# Patient Record
Sex: FEMALE | Race: WHITE | Employment: OTHER | URBAN - METROPOLITAN AREA
[De-identification: names, ages, dates, MRNs, and addresses within clinical notes are randomized per-mention and may not be internally consistent; named-entity substitution may affect disease eponyms.]

---

## 2016-08-16 LAB
CREATININE, EXTERNAL: 0.98
LDL-C, EXTERNAL: 94

## 2017-11-08 ENCOUNTER — CLINICAL SUPPORT (OUTPATIENT)
Dept: CARDIOLOGY CLINIC | Age: 82
End: 2017-11-08

## 2017-11-08 ENCOUNTER — OFFICE VISIT (OUTPATIENT)
Dept: CARDIOLOGY CLINIC | Age: 82
End: 2017-11-08

## 2017-11-08 VITALS
RESPIRATION RATE: 20 BRPM | HEIGHT: 60 IN | SYSTOLIC BLOOD PRESSURE: 140 MMHG | DIASTOLIC BLOOD PRESSURE: 66 MMHG | BODY MASS INDEX: 18.85 KG/M2 | WEIGHT: 96 LBS | HEART RATE: 76 BPM

## 2017-11-08 DIAGNOSIS — I10 HTN (HYPERTENSION), BENIGN: ICD-10-CM

## 2017-11-08 DIAGNOSIS — Z95.0 CARDIAC PACEMAKER IN SITU: Primary | ICD-10-CM

## 2017-11-08 DIAGNOSIS — R55 SYNCOPE, UNSPECIFIED SYNCOPE TYPE: ICD-10-CM

## 2017-11-08 DIAGNOSIS — Z95.0 PACEMAKER: Primary | ICD-10-CM

## 2017-11-08 DIAGNOSIS — I44.2 COMPLETE HEART BLOCK (HCC): ICD-10-CM

## 2017-11-08 RX ORDER — DONEPEZIL HYDROCHLORIDE 10 MG/1
10 TABLET, FILM COATED ORAL
COMMUNITY
Start: 2017-10-27

## 2017-11-08 NOTE — PROGRESS NOTES
HISTORY OF PRESENTING ILLNESS      Mathew Wong is a 80 y.o. female with syncope, advanced heart block who underwent PPM implantation. She presents for follow up of pacemaker. EKG today shows atrial paced rhythm. Device interrogation shows normal device function. Pt says she is doing well with no cardiac complaints today. The patient denies chest pain/ shortness of breath, orthopnea, PND, LE edema, palpitations, syncope, presyncope or fatigue. 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    HTN (hypertension), benign 11/23/2014    Hyperlipidemia 11/23/2014    H/O herpes zoster 11/23/2014           PAST MEDICAL HISTORY     Past Medical History:   Diagnosis Date    Diverticulitis     Hyperlipidemia     Hypertension     Pacemaker     Psychiatric disorder     anxiety    Shingles            PAST SURGICAL HISTORY     Past Surgical History:   Procedure Laterality Date    BREAST SURGERY PROCEDURE UNLISTED      cyst removal    HX APPENDECTOMY      HX OTHER SURGICAL  8/18/15    Medtronic dual chamber PPM          ALLERGIES     No Known Allergies       FAMILY HISTORY     History reviewed. No pertinent family history. negative for cardiac disease       SOCIAL HISTORY     Social History     Social History    Marital status:      Spouse name: N/A    Number of children: N/A    Years of education: N/A     Social History Main Topics    Smoking status: Never Smoker    Smokeless tobacco: Never Used    Alcohol use No      Comment: rarely    Drug use: No    Sexual activity: Not Asked     Other Topics Concern    None     Social History Narrative         MEDICATIONS     Current Outpatient Prescriptions   Medication Sig    donepezil (ARICEPT) 10 mg tablet 10 mg nightly.  losartan (COZAAR) 50 mg tablet One pill by mouth at bedtime.  aspirin delayed-release 81 mg tablet Take 81 mg by mouth daily.  pravastatin (PRAVACHOL) 20 mg tablet Take 20 mg by mouth every evening.  acetaminophen (TYLENOL EXTRA STRENGTH) 500 mg tablet Take 500 mg by mouth two (2) times a day.  cyanocobalamin 1,000 mcg tablet Take 500 mcg by mouth daily.  triamcinolone (NASACORT AQ) 55 mcg nasal inhaler 1 [place] by Both Nostrils route daily as needed.  TETRAHYDROZOLINE HCL/PEG (EYE MOISTURIZING RELIEF OP) Apply 1 Drop to eye daily as needed (dry eyes). No current facility-administered medications for this visit. I have reviewed the nurses notes, vitals, problem list, allergy list, medical history, family, social history and medications. REVIEW OF SYMPTOMS      General: Pt denies excessive weight gain or loss. Pt is able to conduct ADL's  HEENT: Denies blurred vision, headaches, hearing loss, epistaxis and difficulty swallowing. Respiratory: Denies cough, congestion, shortness of breath, MIRANDA, wheezing or stridor. Cardiovascular: Denies precordial pain, palpitations, edema or PND  Gastrointestinal: Denies poor appetite, indigestion, abdominal pain or blood in stool  Genitourinary: Denies hematuria, dysuria, increased urinary frequency  Musculoskeletal: Denies joint pain or swelling from muscles or joints  Neurologic: Denies tremor, paresthesias, headache, or sensory motor disturbance  Psychiatric: Denies confusion, insomnia, depression  Integumentray: Denies rash, itching or ulcers. Hematologic: Denies easy bruising, bleeding       PHYSICAL EXAMINATION      Vitals:    11/08/17 1411   BP: 140/66   Pulse: 76   Resp: 20   Weight: 96 lb (43.5 kg)   Height: 5' (1.524 m)     General: Well developed, in no acute distress. HEENT: No jaundice, oral mucosa moist, no oral ulcers  Neck: Supple, no stiffness, no lymphadenopathy, supple  Heart:  +murmur, Normal S1/S2 negative S3 or S4. Regular,  gallop or rub, no jugular venous distention  Respiratory: Clear bilaterally x 4, no wheezing or rales  Abdomen:   Soft, non-tender, bowel sounds are active.   Extremities:  No edema, normal cap refill, no cyanosis. Musculoskeletal: No clubbing, no deformities  Neuro: A&Ox3, speech clear, gait stable, cooperative, no focal neurologic deficits  Skin: Skin color is normal. No rashes or lesions. Non diaphoretic, moist.  Vascular: 2+ pulses symmetric in all extremities       DIAGNOSTIC DATA      EKG: atrial paced     LABORATORY DATA      Lab Results   Component Value Date/Time    WBC 8.6 08/18/2015 04:35 AM    Hemoglobin (POC) 14.6 06/29/2016 11:35 AM    HGB 12.0 08/18/2015 04:35 AM    Hematocrit (POC) 43 06/29/2016 11:35 AM    HCT 37.4 08/18/2015 04:35 AM    PLATELET 763 30/87/4962 04:35 AM    MCV 99.2 08/18/2015 04:35 AM      Lab Results   Component Value Date/Time    Sodium 139 08/19/2015 03:55 AM    Potassium 4.0 08/19/2015 03:55 AM    Chloride 106 08/19/2015 03:55 AM    CO2 27 08/19/2015 03:55 AM    Anion gap 6 08/19/2015 03:55 AM    Glucose 93 08/19/2015 03:55 AM    BUN 15 08/19/2015 03:55 AM    Creatinine 0.77 08/19/2015 03:55 AM    BUN/Creatinine ratio 19 08/19/2015 03:55 AM    GFR est AA >60 08/19/2015 03:55 AM    GFR est non-AA >60 08/19/2015 03:55 AM    Calcium 9.2 08/19/2015 03:55 AM    Bilirubin, total 0.5 08/18/2015 04:35 AM    AST (SGOT) 15 08/18/2015 04:35 AM    Alk. phosphatase 46 08/18/2015 04:35 AM    Protein, total 5.9 08/18/2015 04:35 AM    Albumin 3.5 08/18/2015 04:35 AM    Globulin 2.4 08/18/2015 04:35 AM    A-G Ratio 1.5 08/18/2015 04:35 AM    ALT (SGPT) 18 08/18/2015 04:35 AM           ASSESSMENT      1. Complete heart block  2. PPM  3. Syncope  4. Hypertension  5. Hyperlipidemia     PLAN     Continue with current medical therapy and per device clinic.      FOLLOW-UP   1 year    Thank you, Royal Hou MD for allowing me to participate in the care of this extraordinarily pleasant female. Please do not hesitate to contact me for further questions/concerns.          Renteria Haring, NP      [address] [facility] [address]  [address] [facility], [address], [place] [facility] [place]. Kyle Velez.    [facility], [facility]  [phone] / [phone] Fax   [phone] / [phone] Fax

## 2017-11-08 NOTE — PROGRESS NOTES
Visit Vitals    /66 (BP 1 Location: Left arm, BP Patient Position: Sitting)    Pulse 76    Resp 20    Ht 5' (1.524 m)    Wt 96 lb (43.5 kg)    BMI 18.75 kg/m2

## 2017-11-15 ENCOUNTER — HOSPITAL ENCOUNTER (EMERGENCY)
Age: 82
Discharge: HOME | End: 2017-11-15
Attending: EMERGENCY MEDICINE | Admitting: EMERGENCY MEDICINE
Payer: MEDICARE

## 2017-11-15 ENCOUNTER — APPOINTMENT (OUTPATIENT)
Dept: CT IMAGING | Age: 82
End: 2017-11-15
Attending: EMERGENCY MEDICINE
Payer: MEDICARE

## 2017-11-15 ENCOUNTER — APPOINTMENT (OUTPATIENT)
Dept: GENERAL RADIOLOGY | Age: 82
End: 2017-11-15
Attending: EMERGENCY MEDICINE
Payer: MEDICARE

## 2017-11-15 VITALS
DIASTOLIC BLOOD PRESSURE: 79 MMHG | RESPIRATION RATE: 24 BRPM | TEMPERATURE: 98.4 F | SYSTOLIC BLOOD PRESSURE: 146 MMHG | HEART RATE: 74 BPM | WEIGHT: 94 LBS | OXYGEN SATURATION: 99 % | BODY MASS INDEX: 18.36 KG/M2

## 2017-11-15 DIAGNOSIS — R53.1 WEAKNESS: ICD-10-CM

## 2017-11-15 DIAGNOSIS — J20.9 ACUTE BRONCHITIS, UNSPECIFIED ORGANISM: Primary | ICD-10-CM
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
## 2017-11-15 LAB
ALBUMIN SERPL-MCNC: 3.7 G/DL (ref 3.5–5)
ALBUMIN/GLOB SERPL: 1.1 {RATIO} (ref 1.1–2.2)
ALP SERPL-CCNC: 62 U/L (ref 45–117)
ALT SERPL-CCNC: 16 U/L (ref 12–78)
ANION GAP SERPL CALC-SCNC: 7 MMOL/L (ref 5–15)
APPEARANCE UR: CLEAR
AST SERPL-CCNC: 15 U/L (ref 15–37)
ATRIAL RATE: 75 BPM
BACTERIA URNS QL MICRO: ABNORMAL /HPF
BASOPHILS # BLD: 0.1 K/UL (ref 0–0.1)
BASOPHILS NFR BLD: 1 % (ref 0–1)
BILIRUB SERPL-MCNC: 0.6 MG/DL (ref 0.2–1)
BILIRUB UR QL: NEGATIVE
BUN SERPL-MCNC: 18 MG/DL (ref 6–20)
BUN/CREAT SERPL: 16 (ref 12–20)
CALCIUM SERPL-MCNC: 9.5 MG/DL (ref 8.5–10.1)
CALCULATED P AXIS, ECG09: 62 DEGREES
CALCULATED R AXIS, ECG10: 27 DEGREES
CALCULATED T AXIS, ECG11: 53 DEGREES
CHLORIDE SERPL-SCNC: 103 MMOL/L (ref 97–108)
CO2 SERPL-SCNC: 29 MMOL/L (ref 21–32)
COLOR UR: ABNORMAL
CREAT SERPL-MCNC: 1.1 MG/DL (ref 0.55–1.02)
DIAGNOSIS, 93000: NORMAL
DIFFERENTIAL METHOD BLD: ABNORMAL
EOSINOPHIL # BLD: 0.1 K/UL (ref 0–0.4)
EOSINOPHIL NFR BLD: 1 % (ref 0–7)
EPITH CASTS URNS QL MICRO: ABNORMAL /LPF
ERYTHROCYTE [DISTWIDTH] IN BLOOD BY AUTOMATED COUNT: 13.6 % (ref 11.5–14.5)
GLOBULIN SER CALC-MCNC: 3.3 G/DL (ref 2–4)
GLUCOSE SERPL-MCNC: 114 MG/DL (ref 65–100)
GLUCOSE UR STRIP.AUTO-MCNC: NEGATIVE MG/DL
HCT VFR BLD AUTO: 46.9 % (ref 35–47)
HGB BLD-MCNC: 15.4 G/DL (ref 11.5–16)
HGB UR QL STRIP: NEGATIVE
KETONES UR QL STRIP.AUTO: ABNORMAL MG/DL
LACTATE SERPL-SCNC: 1 MMOL/L (ref 0.4–2)
LEUKOCYTE ESTERASE UR QL STRIP.AUTO: NEGATIVE
LYMPHOCYTES # BLD: 0.7 K/UL (ref 0.8–3.5)
LYMPHOCYTES NFR BLD: 6 % (ref 12–49)
MCH RBC QN AUTO: 32.8 PG (ref 26–34)
MCHC RBC AUTO-ENTMCNC: 32.8 G/DL (ref 30–36.5)
MCV RBC AUTO: 99.8 FL (ref 80–99)
MONOCYTES # BLD: 0.6 K/UL (ref 0–1)
MONOCYTES NFR BLD: 5 % (ref 5–13)
MUCOUS THREADS URNS QL MICRO: ABNORMAL /LPF
NEUTS SEG # BLD: 10.8 K/UL (ref 1.8–8)
NEUTS SEG NFR BLD: 87 % (ref 32–75)
NITRITE UR QL STRIP.AUTO: NEGATIVE
P-R INTERVAL, ECG05: 160 MS
PH UR STRIP: 8 [PH] (ref 5–8)
PLATELET # BLD AUTO: 261 K/UL (ref 150–400)
POTASSIUM SERPL-SCNC: 4.3 MMOL/L (ref 3.5–5.1)
PROT SERPL-MCNC: 7 G/DL (ref 6.4–8.2)
PROT UR STRIP-MCNC: ABNORMAL MG/DL
Q-T INTERVAL, ECG07: 384 MS
QRS DURATION, ECG06: 64 MS
QTC CALCULATION (BEZET), ECG08: 428 MS
RBC # BLD AUTO: 4.7 M/UL (ref 3.8–5.2)
RBC #/AREA URNS HPF: ABNORMAL /HPF (ref 0–5)
RBC MORPH BLD: ABNORMAL
SODIUM SERPL-SCNC: 139 MMOL/L (ref 136–145)
SP GR UR REFRACTOMETRY: 1.01 (ref 1–1.03)
TROPONIN I BLD-MCNC: <0.04 NG/ML (ref 0–0.08)
UA: UC IF INDICATED,UAUC: ABNORMAL
UROBILINOGEN UR QL STRIP.AUTO: 0.2 EU/DL (ref 0.2–1)
VENTRICULAR RATE, ECG03: 75 BPM
WBC # BLD AUTO: 12.3 K/UL (ref 3.6–11)
WBC URNS QL MICRO: ABNORMAL /HPF (ref 0–4)

## 2017-11-15 PROCEDURE — 74011250636 HC RX REV CODE- 250/636: Performed by: EMERGENCY MEDICINE

## 2017-11-15 PROCEDURE — 93005 ELECTROCARDIOGRAM TRACING: CPT

## 2017-11-15 PROCEDURE — 36415 COLL VENOUS BLD VENIPUNCTURE: CPT | Performed by: EMERGENCY MEDICINE

## 2017-11-15 PROCEDURE — 96360 HYDRATION IV INFUSION INIT: CPT

## 2017-11-15 PROCEDURE — 90715 TDAP VACCINE 7 YRS/> IM: CPT | Performed by: EMERGENCY MEDICINE

## 2017-11-15 PROCEDURE — 73080 X-RAY EXAM OF ELBOW: CPT

## 2017-11-15 PROCEDURE — 90471 IMMUNIZATION ADMIN: CPT

## 2017-11-15 PROCEDURE — 87086 URINE CULTURE/COLONY COUNT: CPT | Performed by: EMERGENCY MEDICINE

## 2017-11-15 PROCEDURE — 83605 ASSAY OF LACTIC ACID: CPT | Performed by: EMERGENCY MEDICINE

## 2017-11-15 PROCEDURE — 85025 COMPLETE CBC W/AUTO DIFF WBC: CPT | Performed by: EMERGENCY MEDICINE

## 2017-11-15 PROCEDURE — 96361 HYDRATE IV INFUSION ADD-ON: CPT

## 2017-11-15 PROCEDURE — 71020 XR CHEST PA LAT: CPT

## 2017-11-15 PROCEDURE — 81001 URINALYSIS AUTO W/SCOPE: CPT | Performed by: EMERGENCY MEDICINE

## 2017-11-15 PROCEDURE — 70450 CT HEAD/BRAIN W/O DYE: CPT

## 2017-11-15 PROCEDURE — 99285 EMERGENCY DEPT VISIT HI MDM: CPT

## 2017-11-15 PROCEDURE — 80053 COMPREHEN METABOLIC PANEL: CPT | Performed by: EMERGENCY MEDICINE

## 2017-11-15 PROCEDURE — 84484 ASSAY OF TROPONIN QUANT: CPT

## 2017-11-15 RX ORDER — LEVOFLOXACIN 750 MG/1
750 TABLET ORAL DAILY
Qty: 5 TAB | Refills: 0 | Status: SHIPPED | OUTPATIENT
Start: 2017-11-15 | End: 2018-06-10

## 2017-11-15 RX ADMIN — TETANUS TOXOID, REDUCED DIPHTHERIA TOXOID AND ACELLULAR PERTUSSIS VACCINE, ADSORBED 0.5 ML: 5; 2.5; 8; 8; 2.5 SUSPENSION INTRAMUSCULAR at 10:56

## 2017-11-15 RX ADMIN — SODIUM CHLORIDE 1000 ML: 900 INJECTION, SOLUTION INTRAVENOUS at 10:35

## 2017-11-15 NOTE — ED PROVIDER NOTES
HPI Comments: Hx HTN, hyperlipidemia, S/P pacer, diverticulitis, anxiety, dementia; presents with weakness, a cough and two recent falls; hx is obtained mainly from her daughter who states pt has had increasing weakness over the past 2 weeks; she had a fall about 2 weeks ago and again today; her daughter feels the falls are the result of weakness; pt began with a cough last night and \"coughed all night long\"; pt is more confused also per daughter; no known fever; no resp distress. Good PO intake. No complaints of CP, abd pain, V, D.    Her daughter states pt's  is in [facility] and has had a cough. Marilyn Catherineelor were smooching\" when pt visited him 6 days ago. Her daughter states pt is normally able to walk unassisted and go to the bathroom and fix some meals on her own. Today she cannot stand unassisted. Past Medical History:   Diagnosis Date    Diverticulitis     Hyperlipidemia     Hypertension     Pacemaker     Psychiatric disorder     anxiety    Shingles        Past Surgical History:   Procedure Laterality Date    BREAST SURGERY PROCEDURE UNLISTED      cyst removal    HX APPENDECTOMY      HX OTHER SURGICAL  8/18/15    Medtronic dual chamber PPM         History reviewed. No pertinent family history. Social History     Social History    Marital status:      Spouse name: N/A    Number of children: N/A    Years of education: N/A     Occupational History    Not on file. Social History Main Topics    Smoking status: Never Smoker    Smokeless tobacco: Never Used    Alcohol use No      Comment: rarely    Drug use: No    Sexual activity: Not on file     Other Topics Concern    Not on file     Social History Narrative         ALLERGIES: Review of patient's allergies indicates no known allergies. Review of Systems   All other systems reviewed and are negative.       Vitals:    11/15/17 1014   BP: 146/83   Pulse: 73   Resp: 18   Temp: 98.4 °F (36.9 °C)   SpO2: 98%   Weight: 42.6 kg (94 lb)            Physical Exam   Constitutional: She appears well-developed and well-nourished. No distress. Mildly ill-appearing. Occasional cough. Hard of hearing. HENT:   Head: Normocephalic and atraumatic. Eyes: Conjunctivae are normal. No scleral icterus. Neck: Neck supple. No tracheal deviation present. Cardiovascular: Normal rate, regular rhythm and intact distal pulses. Exam reveals no gallop and no friction rub. Murmur heard. Pulmonary/Chest: Effort normal and breath sounds normal. She has no wheezes. She has no rales. Abdominal: Soft. She exhibits no distension. There is no tenderness. There is no rebound and no guarding. Musculoskeletal:   Left elbow swelling and bruising with mild skin tears noted. No tenderness illicited. FROM,  NVI. Neurological: She is alert. CN's intact; strength and sensation normal.  Limited historian. Follows simple commands. Appears confused but also hard of hearing. Skin: Skin is warm and dry. No rash noted. Psychiatric: She has a normal mood and affect. Nursing note and vitals reviewed. [facility]  ED Course       Procedures    EKG: NSR with sinus arrythmia; rate - 75; normal ST,T.  Jennifer Paul MD  10:40 AM    Progress Note:  Results, treatment, and follow up plan have been discussed with patient/family. Questions were answered. Jennifer Paul MD  12:23 PM    A/P: weakness/cough/recent falls in 80year old with some dementia - reassuring exam; VSS; sats ok; WBC mildly elevated; remainder of CBC, CMP, poc trop, lactate, CXR, EKG, UA ok; feel she is stable for discharge; will treat resp infection with Levaquin; close PCP f/u; return to ED for worsening of condition.   eJnnifer Paul MD  12:25 PM

## 2017-11-15 NOTE — ED NOTES
Pt resting quietly on the stretcher in no distress with family at bedside. Non-adherent dressing and 3\" henry applied to left elbow skin tear and wrapped using a 3\" ace bandage. + CNS pre and post application. Will continue to monitor.

## 2017-11-15 NOTE — DISCHARGE INSTRUCTIONS
Bronchitis: Care Instructions  Your Care Instructions    Bronchitis is inflammation of the bronchial tubes, which carry air to the lungs. The tubes swell and produce mucus, or phlegm. The mucus and inflamed bronchial tubes make you cough. You may have trouble breathing. Most cases of bronchitis are caused by viruses like those that cause colds. Antibiotics usually do not help and they may be harmful. Bronchitis usually develops rapidly and lasts about 2 to 3 weeks in otherwise healthy people. Follow-up care is a key part of your treatment and safety. Be sure to make and go to all appointments, and call your doctor if you are having problems. It's also a good idea to know your test results and keep a list of the medicines you take. How can you care for yourself at home? · Take all medicines exactly as prescribed. Call your doctor if you think you are having a problem with your medicine. · Get some extra rest.  · Take an over-the-counter pain medicine, such as acetaminophen (Tylenol), ibuprofen (Advil, Motrin), or naproxen (Aleve) to reduce fever and relieve body aches. Read and follow all instructions on the label. · Do not take two or more pain medicines at the same time unless the doctor told you to. Many pain medicines have acetaminophen, which is Tylenol. Too much acetaminophen (Tylenol) can be harmful. · Take an over-the-counter cough medicine that contains dextromethorphan to help quiet a dry, hacking cough so that you can sleep. Avoid cough medicines that have more than one active ingredient. Read and follow all instructions on the label. · Breathe moist air from a humidifier, hot shower, or sink filled with hot water. The heat and moisture will thin mucus so you can cough it out. · Do not smoke. Smoking can make bronchitis worse. If you need help quitting, talk to your doctor about stop-smoking programs and medicines. These can increase your chances of quitting for good.   When should you call for help? Call 911 anytime you think you may need emergency care. For example, call if:  ? · You have severe trouble breathing. ?Call your doctor now or seek immediate medical care if:  ? · You have new or worse trouble breathing. ? · You cough up dark brown or bloody mucus (sputum). ? · You have a new or higher fever. ? · You have a new rash. ? Watch closely for changes in your health, and be sure to contact your doctor if:  ? · You cough more deeply or more often, especially if you notice more mucus or a change in the color of your mucus. ? · You are not getting better as expected. Where can you learn more? Go to http://aquiles-loela.info/. Enter H333 in the search box to learn more about \"Bronchitis: Care Instructions. \"  Current as of: May 12, 2017  Content Version: 11.4  © 6491-7192 [employer]. Care instructions adapted under license by [employer] (which disclaims liability or warranty for this information). If you have questions about a medical condition or this instruction, always ask your healthcare professional. [place] [address] any warranty or liability for your use of this information. Weakness: Care Instructions  Your Care Instructions    Weakness is a lack of physical or muscle strength. You may feel that you need to make extra effort to move your arms, legs, or other muscles. Generalized weakness means that you feel weak in most areas of your body. Another type of weakness may affect just one muscle or group of muscles. You may feel weak and tired after you have done too much activity, such as taking an extra-long hike. This is not a serious problem. It often goes away on its own. Feeling weak can also be caused by medical conditions like thyroid problems, depression, or a virus. Sometimes the cause can be serious. Your doctor may want to do more tests to try to find the cause of the weakness.   The doctor has checked you carefully, but problems can develop later. If you notice any problems or new symptoms, get medical treatment right away. Follow-up care is a key part of your treatment and safety. Be sure to make and go to all appointments, and call your doctor if you are having problems. It's also a good idea to know your test results and keep a list of the medicines you take. How can you care for yourself at home? · Rest when you feel tired. · Be safe with medicines. If your doctor prescribed medicine, take it exactly as prescribed. Call your doctor if you think you are having a problem with your medicine. You will get more details on the specific medicines your doctor prescribes. · Do not skip meals. Eating a balanced diet may increase your energy level. · Get some physical activity every day, but do not get too tired. When should you call for help? Call your doctor now or seek immediate medical care if:  ? · You have new or worse weakness. ? · You are dizzy or lightheaded, or you feel like you may faint. ? Watch closely for changes in your health, and be sure to contact your doctor if:  ? · You do not get better as expected. Where can you learn more? Go to http://aquiles-leola.info/. Enter 493 [id number] [id number] in the search box to learn more about \"Weakness: Care Instructions. \"  Current as of: March 20, 2017  Content Version: 11.4  © 2925-3344 [employer]. Care instructions adapted under license by [employer] (which disclaims liability or warranty for this information). If you have questions about a medical condition or this instruction, always ask your healthcare professional. [address] any warranty or liability for your use of this information.

## 2017-11-15 NOTE — ED TRIAGE NOTES
Per daughter pt has experienced two falls over the two weeks without injury. Last fall this am.  Pt states that she went to the bathroom and became weak and fell. Pt denies pain at this time. Daughter states that the pt has been experiencing weakness and cough since last night.

## 2017-11-17 LAB
BACTERIA SPEC CULT: NORMAL
CC UR VC: NORMAL
SERVICE CMNT-IMP: NORMAL

## 2018-06-10 ENCOUNTER — APPOINTMENT (OUTPATIENT)
Dept: GENERAL RADIOLOGY | Age: 83
End: 2018-06-10
Attending: EMERGENCY MEDICINE
Payer: MEDICARE

## 2018-06-10 ENCOUNTER — HOSPITAL ENCOUNTER (EMERGENCY)
Age: 83
Discharge: HOME | End: 2018-06-10
Attending: EMERGENCY MEDICINE
Payer: MEDICARE

## 2018-06-10 VITALS
SYSTOLIC BLOOD PRESSURE: 157 MMHG | WEIGHT: 98 LBS | HEART RATE: 74 BPM | HEIGHT: 61 IN | BODY MASS INDEX: 18.5 KG/M2 | OXYGEN SATURATION: 97 % | DIASTOLIC BLOOD PRESSURE: 73 MMHG | TEMPERATURE: 98.6 F | RESPIRATION RATE: 16 BRPM

## 2018-06-10 DIAGNOSIS — S81.801A WOUND OF RIGHT LOWER EXTREMITY, INITIAL ENCOUNTER: Primary | ICD-10-CM

## 2018-06-10 DIAGNOSIS — L03.115 CELLULITIS OF RIGHT LOWER EXTREMITY: ICD-10-CM

## 2018-06-10 LAB
ANION GAP BLD CALC-SCNC: 16 MMOL/L (ref 10–20)
BASOPHILS # BLD: 0.1 K/UL (ref 0–0.1)
BASOPHILS NFR BLD: 1 % (ref 0–1)
BUN BLD-MCNC: 23 MG/DL (ref 9–20)
CA-I BLD-MCNC: 1.27 MMOL/L (ref 1.12–1.32)
CHLORIDE BLD-SCNC: 100 MMOL/L (ref 98–107)
CO2 BLD-SCNC: 27 MMOL/L (ref 21–32)
CREAT BLD-MCNC: 1 MG/DL (ref 0.6–1.3)
DIFFERENTIAL METHOD BLD: ABNORMAL
EOSINOPHIL # BLD: 0.2 K/UL (ref 0–0.4)
EOSINOPHIL NFR BLD: 2 % (ref 0–7)
ERYTHROCYTE [DISTWIDTH] IN BLOOD BY AUTOMATED COUNT: 13.7 % (ref 11.5–14.5)
GLUCOSE BLD-MCNC: 77 MG/DL (ref 65–100)
HCT VFR BLD AUTO: 40.9 % (ref 35–47)
HCT VFR BLD CALC: 41 % (ref 35–47)
HGB BLD-MCNC: 13.7 G/DL (ref 11.5–16)
LYMPHOCYTES # BLD: 1.5 K/UL (ref 0.8–3.5)
LYMPHOCYTES NFR BLD: 18 % (ref 12–49)
MCH RBC QN AUTO: 33.9 PG (ref 26–34)
MCHC RBC AUTO-ENTMCNC: 33.5 G/DL (ref 30–36.5)
MCV RBC AUTO: 101.2 FL (ref 80–99)
MONOCYTES # BLD: 0.5 K/UL (ref 0–1)
MONOCYTES NFR BLD: 7 % (ref 5–13)
NEUTS SEG # BLD: 6.1 K/UL (ref 1.8–8)
NEUTS SEG NFR BLD: 72 % (ref 32–75)
PLATELET # BLD AUTO: 286 K/UL (ref 150–400)
PMV BLD AUTO: 10.7 FL (ref 8.9–12.9)
POTASSIUM BLD-SCNC: 4.9 MMOL/L (ref 3.5–5.1)
RBC # BLD AUTO: 4.04 M/UL (ref 3.8–5.2)
SERVICE CMNT-IMP: ABNORMAL
SODIUM BLD-SCNC: 137 MMOL/L (ref 136–145)
WBC # BLD AUTO: 8.3 K/UL (ref 3.6–11)
XXWBCSUS: 0

## 2018-06-10 PROCEDURE — 73590 X-RAY EXAM OF LOWER LEG: CPT

## 2018-06-10 PROCEDURE — 80047 BASIC METABLC PNL IONIZED CA: CPT

## 2018-06-10 PROCEDURE — 36415 COLL VENOUS BLD VENIPUNCTURE: CPT | Performed by: EMERGENCY MEDICINE

## 2018-06-10 PROCEDURE — 99283 EMERGENCY DEPT VISIT LOW MDM: CPT

## 2018-06-10 PROCEDURE — 74011250637 HC RX REV CODE- 250/637: Performed by: EMERGENCY MEDICINE

## 2018-06-10 PROCEDURE — 85025 COMPLETE CBC W/AUTO DIFF WBC: CPT | Performed by: EMERGENCY MEDICINE

## 2018-06-10 RX ORDER — DOXYCYCLINE 100 MG/1
100 CAPSULE ORAL 2 TIMES DAILY
Qty: 14 CAP | Refills: 0 | Status: SHIPPED | OUTPATIENT
Start: 2018-06-10 | End: 2018-10-26

## 2018-06-10 RX ADMIN — BACITRACIN ZINC, NEOMYCIN SULFATE, POLYMYXIN B SULFATE 1 PACKET: 3.5; 5000; 4 OINTMENT TOPICAL at 17:00

## 2018-06-10 NOTE — ED PROVIDER NOTES
HPI Comments: 79 yo WF with pacemaker presents with c/o wound to left calf. Per family they are unsure how long it was there but pt complained about it today. She walks with a cane and they deny recent falls or injury. She is not diabetic, has not had a fever and has been acting like herself per family. They cleaned it and put neosporin on it with a bandaid. She declines anything for pain. Unknown last tetanus    Patient is a 80 y.o. female presenting with skin problem. The history is provided by a relative and the patient. Skin Problem           Past Medical History:   Diagnosis Date    Diverticulitis     Hyperlipidemia     Hypertension     Pacemaker     Psychiatric disorder     anxiety    Shingles        Past Surgical History:   Procedure Laterality Date    BREAST SURGERY PROCEDURE UNLISTED      cyst removal    HX APPENDECTOMY      HX OTHER SURGICAL  8/18/15    Medtronic dual chamber PPM         History reviewed. No pertinent family history. Social History     Social History    Marital status:      Spouse name: N/A    Number of children: N/A    Years of education: N/A     Occupational History    Not on file. Social History Main Topics    Smoking status: Never Smoker    Smokeless tobacco: Never Used    Alcohol use No      Comment: rarely    Drug use: No    Sexual activity: Not on file     Other Topics Concern    Not on file     Social History Narrative         ALLERGIES: Review of patient's allergies indicates no known allergies. Review of Systems   Constitutional: Negative for chills and fever. Gastrointestinal: Negative for nausea and vomiting. Skin: Positive for wound. All other systems reviewed and are negative. Vitals:    06/10/18 1546   BP: 157/73   Pulse: 74   Resp: 16   Temp: 98.6 °F (37 °C)   SpO2: 97%   Weight: 44.5 kg (98 lb)   Height: 5' 1\" (1.549 m)            Physical Exam   Constitutional: She appears well-developed and well-nourished. No distress. HENT:   Head: Normocephalic and atraumatic. Eyes: Conjunctivae are normal.   Neck: Normal range of motion. Neck supple. Cardiovascular: Normal rate, regular rhythm, normal heart sounds and intact distal pulses. Exam reveals no friction rub. No murmur heard. Palpable dp pulses b/l with warm feet   Pulmonary/Chest: Effort normal and breath sounds normal. No respiratory distress. She has no wheezes. She has no rales. She exhibits no tenderness. Abdominal: Soft. Bowel sounds are normal. She exhibits no distension. There is no tenderness. There is no rebound and no guarding. Musculoskeletal: Normal range of motion. She exhibits no edema or tenderness. Neurological: She is alert. She exhibits normal muscle tone. Coordination normal.   Skin: Skin is warm and dry. She is not diaphoretic. No pallor. Right posterior calf with 3 abraded areas; some surrounding erythema no drainage   Psychiatric: She has a normal mood and affect. Her behavior is normal.   Nursing note and vitals reviewed. MDM  Number of Diagnoses or Management Options  Cellulitis of right lower extremity:   Wound of right lower extremity, initial encounter:   Diagnosis management comments: Looks like pt had a break in the skin and now has a cellulitis no ulcer formation yet. Check white count and xray given age but low suspicion for gas forming organism or blood stream infection       Amount and/or Complexity of Data Reviewed  Clinical lab tests: ordered and reviewed  Tests in the radiology section of CPT®: ordered and reviewed  Obtain history from someone other than the patient: yes (daughter)    Patient Progress  Patient progress: stable        ED Course       Procedures                           4:41 PM  Labs and xray reassuring. Start doxycycline to cover for mrsa and refer to pcp and wound clinic as suspect she will have impaired wound healing with age.  Discussed sx of worsening infection and reasons to return

## 2018-06-10 NOTE — ED TRIAGE NOTES
Pt presents to ED with c/o wound on right lower leg. ? venous stasis ulcer. There is erythema around the area. Pedal pulse palpated. NV intact.

## 2018-06-10 NOTE — ED NOTES
Patient  discharged with instructions to pt's daughter per Dr Rachael Poon. Instructions reviewed with pt and her daughter.

## 2018-06-10 NOTE — DISCHARGE INSTRUCTIONS
We hope that we have addressed all of your medical concerns. The examination and treatment you received in the Emergency Department were for an emergent problem and were not intended as complete care. It is important that you follow up with your healthcare provider(s) for ongoing care. If your symptoms worsen or do not improve as expected, and you are unable to reach your usual health care provider(s), you should return to the Emergency Department. Today's healthcare is undergoing tremendous change, and patient satisfaction surveys are one of the many tools to assess the quality of medical care. You may receive a survey from the [employer] organization regarding your experience in the Emergency Department. I hope that your experience has been completely positive, particularly the medical care that I provided. As such, please participate in the survey; anything less than excellent does not meet my expectations or intentions. [facility] and 8 [facility] (formerly [facility]) participate in nationally recognized quality of care measures. If your blood pressure is greater than 120/80, as reported below, we urge that you seek medical care to address the potential of high blood pressure, commonly known as hypertension. Hypertension can be hereditary or can be caused by certain medical conditions, pain, stress, or \"white coat syndrome. \"       Please make an appointment with your health care provider(s) for follow up of your Emergency Department visit. VITALS:   Patient Vitals for the past 8 hrs:   Temp Pulse Resp BP SpO2   06/10/18 1546 98.6 °F (37 °C) 74 16 157/73 97 %          Thank you for allowing us to provide you with medical care today. We realize that you have many choices for your emergency care needs. Please choose us in the future for any continued health care needs.       Baldwin Closs, MD    [facility]   Office: [phone]            Recent Results (from the past 24 hour(s))   CBC WITH AUTOMATED DIFF    Collection Time: 06/10/18  4:08 PM   Result Value Ref Range    WBC 8.3 3.6 - 11.0 K/uL    RBC 4.04 3.80 - 5.20 M/uL    HGB 13.7 11.5 - 16.0 g/dL    HCT 40.9 35.0 - 47.0 %    .2 (H) 80.0 - 99.0 FL    MCH 33.9 26.0 - 34.0 PG    MCHC 33.5 30.0 - 36.5 g/dL    RDW 13.7 11.5 - 14.5 %    PLATELET 331 759 - 995 K/uL    MPV 10.7 8.9 - 12.9 FL    NEUTROPHILS 72 32 - 75 %    LYMPHOCYTES 18 12 - 49 %    MONOCYTES 7 5 - 13 %    EOSINOPHILS 2 0 - 7 %    BASOPHILS 1 0 - 1 %    ABS. NEUTROPHILS 6.1 1.8 - 8.0 K/UL    ABS. LYMPHOCYTES 1.5 0.8 - 3.5 K/UL    ABS. MONOCYTES 0.5 0.0 - 1.0 K/UL    ABS. EOSINOPHILS 0.2 0.0 - 0.4 K/UL    ABS. BASOPHILS 0.1 0.0 - 0.1 K/UL    DF AUTOMATED      XXWBCSUS 0     POC CHEM8    Collection Time: 06/10/18  4:10 PM   Result Value Ref Range    Calcium, ionized (POC) 1.27 1.12 - 1.32 mmol/L    Sodium (POC) 137 136 - 145 mmol/L    Potassium (POC) 4.9 3.5 - 5.1 mmol/L    Chloride (POC) 100 98 - 107 mmol/L    CO2 (POC) 27 21 - 32 mmol/L    Anion gap (POC) 16 10 - 20 mmol/L    Glucose (POC) 77 65 - 100 mg/dL    BUN (POC) 23 (H) 9 - 20 mg/dL    Creatinine (POC) 1.0 0.6 - 1.3 mg/dL    GFRAA, POC >60 >60 ml/min/1.73m2    GFRNA, POC 51 (L) >60 ml/min/1.73m2    Hematocrit (POC) 41 35.0 - 47.0 %    Comment Comment Not Indicated. Xr Tib/fib Rt    Result Date: 6/10/2018  EXAM:  XR TIB/FIB RT INDICATION:  wound to posterior calf eval for gas. COMPARISON: None. FINDINGS: AP and lateral  views of the right tibia and fibula demonstrate no fracture or other acute osseous, articular or soft tissue abnormality. No soft tissue gas is identified. IMPRESSION: No acute abnormality. Cellulitis: Care Instructions  Your Care Instructions    Cellulitis is a skin infection. It often occurs after a break in the skin from a scrape, cut, bite, or puncture, or after a rash.   The doctor has checked you carefully, but problems can develop later. If you notice any problems or new symptoms, get medical treatment right away. Follow-up care is a key part of your treatment and safety. Be sure to make and go to all appointments, and call your doctor if you are having problems. It's also a good idea to know your test results and keep a list of the medicines you take. How can you care for yourself at home? · Take your antibiotics as directed. Do not stop taking them just because you feel better. You need to take the full course of antibiotics. · Prop up the infected area on pillows to reduce pain and swelling. Try to keep the area above the level of your heart as often as you can. · If your doctor told you how to care for your wound, follow your doctor's instructions. If you did not get instructions, follow this general advice:  ¨ Wash the wound with clean water 2 times a day. Don't use hydrogen peroxide or alcohol, which can slow healing. ¨ You may cover the wound with a thin layer of petroleum jelly, such as Vaseline, and a nonstick bandage. ¨ Apply more petroleum jelly and replace the bandage as needed. · Be safe with medicines. Take pain medicines exactly as directed. ¨ If the doctor gave you a prescription medicine for pain, take it as prescribed. ¨ If you are not taking a prescription pain medicine, ask your doctor if you can take an over-the-counter medicine. To prevent cellulitis in the future  · Try to prevent cuts, scrapes, or other injuries to your skin. Cellulitis most often occurs where there is a break in the skin. · If you get a scrape, cut, mild burn, or bite, wash the wound with clean water as soon as you can to help avoid infection. Don't use hydrogen peroxide or alcohol, which can slow healing. · If you have swelling in your legs (edema), support stockings and good skin care may help prevent leg sores and cellulitis.   · Take care of your feet, especially if you have diabetes or other conditions that increase the risk of infection. Wear shoes and socks. Do not go barefoot. If you have athlete's foot or other skin problems on your feet, talk to your doctor about how to treat them. When should you call for help? Call your doctor now or seek immediate medical care if:  ? · You have signs that your infection is getting worse, such as:  ¨ Increased pain, swelling, warmth, or redness. ¨ Red streaks leading from the area. ¨ Pus draining from the area. ¨ A fever. ? · You get a rash. ? Watch closely for changes in your health, and be sure to contact your doctor if:  ? · You are not getting better after 1 day (24 hours). ? · You do not get better as expected. Where can you learn more? Go to http://aquiles-leola.info/. Angela Arvizu in the search box to learn more about \"Cellulitis: Care Instructions. \"  Current as of: October 13, 2016  Content Version: 11.4  © 3830-4143 [employer], Incorporated. Care instructions adapted under license by [employer] (which disclaims liability or warranty for this information). If you have questions about a medical condition or this instruction, always ask your healthcare professional. [place] [address] any warranty or liability for your use of this information.

## 2018-06-18 ENCOUNTER — HOSPITAL ENCOUNTER (OUTPATIENT)
Dept: WOUND CARE | Age: 83
Discharge: HOME | End: 2018-06-18
Payer: MEDICARE

## 2018-06-18 VITALS
SYSTOLIC BLOOD PRESSURE: 97 MMHG | BODY MASS INDEX: 18.33 KG/M2 | DIASTOLIC BLOOD PRESSURE: 54 MMHG | RESPIRATION RATE: 16 BRPM | WEIGHT: 97 LBS | HEART RATE: 65 BPM | TEMPERATURE: 98.6 F

## 2018-06-18 PROCEDURE — 99205 OFFICE O/P NEW HI 60 MIN: CPT

## 2018-06-18 NOTE — WOUND CARE
06/18/18 1351   Wound Leg Lower Right;Posterior   Date First Assessed/Time First Assessed: 06/18/18 1350   Wound Type:  Other  Location: Leg Lower  Orientation: Right;Posterior   DRESSING STATUS Clean, dry, and intact   Wound Length (cm) 0.7 cm   Wound Width (cm) 0.3 cm   Wound Depth (cm) 0.1   Wound Surface area (cm^2) 0.21 cm^2   Condition of Base Eschar   Drainage Amount  None   Wound Odor None   Cleansing and Cleansing Agents  Normal saline

## 2018-06-18 NOTE — H&P
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 Local wound care: Mepilex foam with border, change as needed   Discussed need for high protein diet to aid healing  Return to clinic 1 month    Signed By: Kala Alfaro MD  [facility]  Office:  [phone]    June 18, 2018          Wound Care H&P    Subjective:      I was asked to see Sol Quiros by Dr. Emilee Campbell for management of a chronic right posterior calf ulcer. The patient is a 80 y.o. female with an 8 day history of a right posterior calf wound. She initially presented to the ER on 6/10/18 and was diagnosed with a skin tear with surrounding cellulitis. She was discharged home on doxycycline. She denies recent trauma, fevers, chills, nausea, emesis, wound drainage. Past Medical History:   Diagnosis Date    Diverticulitis     Hyperlipidemia     Hypertension     Ill-defined condition     pacemaker    Pacemaker     Psychiatric disorder     anxiety    Shingles        Past Surgical History:   Procedure Laterality Date    BREAST SURGERY PROCEDURE UNLISTED      cyst removal    HX APPENDECTOMY      HX OTHER SURGICAL  8/18/15    Medtronic dual chamber PPM      Family History   Problem Relation Age of Onset    Cancer Mother      Social History     Social History    Marital status:      Spouse name: N/A    Number of children: N/A    Years of education: N/A     Social History Main Topics    Smoking status: Never Smoker    Smokeless tobacco: Never Used    Alcohol use No      Comment: rarely    Drug use: No    Sexual activity: Not Asked     Other Topics Concern    None     Social History Narrative      Current Outpatient Prescriptions   Medication Sig    doxycycline (MONODOX) 100 mg capsule Take 1 Cap by mouth two (2) times a day.  donepezil (ARICEPT) 10 mg tablet 10 mg nightly.  losartan (COZAAR) 50 mg tablet One pill by mouth at bedtime.  aspirin delayed-release 81 mg tablet Take 81 mg by mouth daily.  pravastatin (PRAVACHOL) 20 mg tablet Take 20 mg by mouth every evening.  acetaminophen (TYLENOL EXTRA STRENGTH) 500 mg tablet Take 500 mg by mouth two (2) times a day.  cyanocobalamin 1,000 mcg tablet Take 500 mcg by mouth daily. No current facility-administered medications for this encounter. No Known Allergies    Review of Systems:     []     Unable to obtain  ROS due to  []    mental status change  []    sedated   []    intubated   [x]    Total of 12 system negative, unless specified below or in HPI:  Constitutional: negative fever, negative chills, negative weight loss  Eyes:   negative visual changes  ENT:   negative sore throat, tongue or lip swelling  Respiratory:  negative cough, negative dyspnea  Cards:  negative for chest pain, palpitations, lower extremity edema  GI:   negative for nausea, vomiting, diarrhea, and abdominal pain  :  negative for frequency, dysuria  Integument:  negative for rash and pruritus  Heme:  negative for easy bruising and gum/nose bleeding  Musculoskel: negative for myalgias,  back pain and muscle weakness  Neuro:  negative for headaches, dizziness, vertigo  Psych:  negative for feelings of anxiety, depression     Objective:      Patient Vitals for the past 8 hrs:   BP Temp Pulse Resp Weight   18 1337 97/54 98.6 °F (37 °C) 65 16 44 kg (97 lb)       Temp (24hrs), Av.6 °F (37 °C), Min:98.6 °F (37 °C), Max:98.6 °F (37 °C)      Physical Exam:  General:  Alert, cooperative, no distress, appears stated age. Eyes:  Conjunctivae/corneas clear. PERRL, EOMs intact. Nose: Nares normal. Septum midline. Mucosa normal. No drainage or sinus tenderness.    Mouth/Throat: Lips, mucosa, and tongue normal. Teeth and gums normal.   Neck: Supple, symmetrical, trachea midline, no adenopathy, thyroid: no enlargment/tenderness/nodules, no carotid bruit and no JVD. Back:   Symmetric, no curvature. ROM normal. No CVA tenderness. Lungs:   Clear to auscultation bilaterally. Heart:  Regular rate and rhythm, S1, S2 normal, no murmur, click, rub or gallop. Abdomen:   Soft, non-tender. Bowel sounds normal. No masses,  No organomegaly. Extremities: Extremities normal, atraumatic, no cyanosis or edema. Pulses: 2+ and symmetric all extremities. Skin: WOUND POA CONDITIONS    Wound Leg Lower (Active)   Number of days:8       Wound Leg Lower Right;Posterior (Active)   DRESSING STATUS Clean, dry, and intact 6/18/2018  1:51 PM   Wound Length (cm) 0.7 cm 6/18/2018  1:51 PM   Wound Width (cm) 0.3 cm 6/18/2018  1:51 PM   Wound Depth (cm) 0.1 6/18/2018  1:51 PM   Wound Surface area (cm^2) 0.21 cm^2 6/18/2018  1:51 PM   Condition of Base Eschar 6/18/2018  1:51 PM   Drainage Amount  None 6/18/2018  1:51 PM   Wound Odor None 6/18/2018  1:51 PM   Cleansing and Cleansing Agents  Normal saline 6/18/2018  1:51 PM   Number of days:0              Lymph nodes: Cervical, supraclavicular, and axillary nodes normal.     Labs: No results for input(s): WBC, HGB, HCT, PLT, HGBEXT, HCTEXT, PLTEXT in the last 72 hours. No results for input(s): NA, K, CL, CO2, GLU, BUN, CREA, CA, MG, PHOS, ALB, TBIL, TBILI, SGOT, ALT in the last 72 hours. No results for input(s): INR in the last 72 hours.     No lab exists for component: INREXT

## 2018-07-16 ENCOUNTER — APPOINTMENT (OUTPATIENT)
Dept: WOUND CARE | Age: 83
End: 2018-07-16

## 2018-10-16 ENCOUNTER — HOSPITAL ENCOUNTER (EMERGENCY)
Age: 83
Discharge: HOME | End: 2018-10-16
Attending: EMERGENCY MEDICINE
Payer: MEDICARE

## 2018-10-16 ENCOUNTER — APPOINTMENT (OUTPATIENT)
Dept: CT IMAGING | Age: 83
End: 2018-10-16
Attending: EMERGENCY MEDICINE
Payer: MEDICARE

## 2018-10-16 VITALS
HEIGHT: 60 IN | DIASTOLIC BLOOD PRESSURE: 89 MMHG | SYSTOLIC BLOOD PRESSURE: 167 MMHG | HEART RATE: 83 BPM | TEMPERATURE: 97.6 F | BODY MASS INDEX: 19.63 KG/M2 | OXYGEN SATURATION: 95 % | RESPIRATION RATE: 18 BRPM | WEIGHT: 100 LBS

## 2018-10-16 DIAGNOSIS — S00.03XA CONTUSION OF SCALP, INITIAL ENCOUNTER: ICD-10-CM

## 2018-10-16 DIAGNOSIS — S51.011A SKIN TEAR OF RIGHT ELBOW WITHOUT COMPLICATION, INITIAL ENCOUNTER: ICD-10-CM
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
## 2018-10-16 PROCEDURE — 70450 CT HEAD/BRAIN W/O DYE: CPT

## 2018-10-16 PROCEDURE — 99282 EMERGENCY DEPT VISIT SF MDM: CPT

## 2018-10-16 PROCEDURE — 74011250637 HC RX REV CODE- 250/637: Performed by: EMERGENCY MEDICINE

## 2018-10-16 RX ADMIN — BACITRACIN ZINC, NEOMYCIN SULFATE, POLYMYXIN B SULFATE 1 PACKET: 3.5; 5000; 4 OINTMENT TOPICAL at 05:23

## 2018-10-16 NOTE — ED PROVIDER NOTES
HPI Comments: Patient was going to the bathroom and fell and hit her head at about 0330 this morning. Per daughter, patient walked into the walk in shower, through the curtain, hitting her head on the far wall and then falling. Patient has a small skin tear to the right elbow. Patient states no LOC. Per daughter, patient was Alexa Cristal that has been making her dizzy, but she stopped the medication on Sunday. Patient is a 80 y.o. female presenting with fall. The history is provided by the patient and a relative. Fall The accident occurred 1 to 2 hours ago. The fall occurred while walking. She fell from a height of ground level. She landed on hard floor. The point of impact was the head. The pain is present in the head. The pain is at a severity of 4/10. The pain is moderate. Associated symptoms include laceration (skin tear). Pertinent negatives include no vomiting, no extremity weakness and no loss of consciousness. The risk factors include being elderly, dementia and recurrent falls. She has tried nothing for the symptoms. Past Medical History:  
Diagnosis Date  Diverticulitis  Hyperlipidemia  Hypertension  Ill-defined condition   
 pacemaker  Pacemaker  Psychiatric disorder   
 anxiety  Shingles Past Surgical History:  
Procedure Laterality Date  BREAST SURGERY PROCEDURE UNLISTED    
 cyst removal  
 HX APPENDECTOMY  HX OTHER SURGICAL  8/18/15 Medtronic dual chamber PPM  
 
   
Family History:  
Problem Relation Age of Onset  Cancer Mother Social History Social History  Marital status:  Spouse name: N/A  
 Number of children: N/A  
 Years of education: N/A Occupational History  Not on file. Social History Main Topics  Smoking status: Never Smoker  Smokeless tobacco: Never Used  Alcohol use No  
   Comment: rarely  Drug use: No  
 Sexual activity: Not on file Other Topics Concern  Not on file Social History Narrative ALLERGIES: Review of patient's allergies indicates no known allergies. Review of Systems Unable to perform ROS: Dementia Gastrointestinal: Negative for vomiting. Musculoskeletal: Negative for extremity weakness. Neurological: Negative for loss of consciousness. Vitals:  
 10/16/18 2391 BP: 167/89 Pulse: 83 Resp: 18 Temp: 97.6 °F (36.4 °C) SpO2: 95% Weight: 45.4 kg (100 lb) Height: 5' (1.524 m) Physical Exam  
Constitutional: She is oriented to person, place, and time. She appears well-developed and well-nourished. No distress. HENT:  
Head: Normocephalic. Head is with contusion. Right Ear: External ear normal.  
Left Ear: External ear normal.  
Nose: Nose normal.  
Mouth/Throat: Oropharynx is clear and moist. No oropharyngeal exudate. Eyes: Conjunctivae and EOM are normal. Pupils are equal, round, and reactive to light. Right eye exhibits no discharge. Left eye exhibits no discharge. No scleral icterus. Neck: Normal range of motion. Neck supple. No JVD present. No tracheal deviation present. Cardiovascular: Normal rate, regular rhythm, normal heart sounds and intact distal pulses. Exam reveals no gallop and no friction rub. No murmur heard. Pulmonary/Chest: Effort normal and breath sounds normal. No stridor. No respiratory distress. She has no decreased breath sounds. She has no wheezes. She has no rhonchi. She has no rales. She exhibits no tenderness. Abdominal: Soft. Bowel sounds are normal. She exhibits no distension. There is no tenderness. There is no rebound and no guarding. Musculoskeletal: Normal range of motion. She exhibits no edema or tenderness. Right elbow: She exhibits laceration. She exhibits normal range of motion, no swelling and no deformity. Arms: 
Neurological: She is alert and oriented to person, place, and time. She has normal strength and normal reflexes.  No cranial nerve deficit or sensory deficit. She exhibits normal muscle tone. Coordination normal. GCS eye subscore is 4. GCS verbal subscore is 5. GCS motor subscore is 6. Skin: Skin is warm and dry. Laceration (skin tear) noted. No rash noted. She is not diaphoretic. No erythema. No pallor. Psychiatric: She has a normal mood and affect. Her behavior is normal.  
Nursing note and vitals reviewed. MDM Number of Diagnoses or Management Options Contusion of scalp, initial encounter:  
Fall, initial encounter:  
Skin tear of right elbow without complication, initial encounter:  
  
Amount and/or Complexity of Data Reviewed Tests in the radiology section of CPT®: ordered and reviewed Risk of Complications, Morbidity, and/or Mortality Presenting problems: moderate Diagnostic procedures: moderate Management options: moderate Patient Progress Patient progress: stable ED Course Procedures Chief Complaint Patient presents with  Fall  Head Injury The patient's presenting problems have been discussed, and they are in agreement with the care plan formulated and outlined with them. I have encouraged them to ask questions as they arise throughout their visit. MEDICATIONS GIVEN: 
Medications  
neomycin-bacitracnZn-polymyxnB (NEOSPORIN) ointment 1 Packet (1 Packet Topical Given 10/16/18 0523) LABS REVIEWED: 
No results found for this or any previous visit (from the past 24 hour(s)). VITAL SIGNS: 
Patient Vitals for the past 12 hrs: 
 Temp Pulse Resp BP SpO2  
10/16/18 0432 97.6 °F (36.4 °C) 83 18 167/89 95 % RADIOLOGY RESULTS: 
The following have been ordered and reviewed: 
Ct Head Wo Cont Result Date: 10/16/2018 EXAM:  CT HEAD WO CONT INDICATION:   fall, head injury COMPARISON: November 15, 2017. CONTRAST:  None. TECHNIQUE: Unenhanced CT of the head was performed using 5 mm images. Brain and bone windows were generated. CT dose reduction was achieved through use of a standardized protocol tailored for this examination and automatic exposure control for dose modulation. Adaptive statistical iterative reconstruction (ASIR) was utilized. FINDINGS: The ventricles and sulci are stable in size, shape and configuration and midline. There is unchanged diffuse periventricular white matter disease. 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 There is no intracranial hemorrhage, extra-axial collection, mass, mass effect or midline shift. The basilar cisterns are open. No acute infarct is identified. The bone windows demonstrate no abnormalities. The visualized portions of the paranasal sinuses and mastoid air cells are clear. IMPRESSION: No acute intracranial process. No change from the prior study. PROGRESS NOTES: 
Discussed results and plan with patient and daughter. Patient will be discharged home with PCP follow up. Patient instructed to return to the emergency room for any worsening symptoms or any other concerns. DIAGNOSIS: 
 
1. Fall, initial encounter 2. Contusion of scalp, initial encounter 3. Skin tear of right elbow without complication, initial encounter PLAN: 
Follow-up Information Follow up With Details Comments Contact Info Brooke Camara MD In 1 week As needed [address] [facility] 
[phone] [facility] EMERGENCY DEPT  If symptoms worsen [place] [address] [address] [facility] 
[phone] Current Discharge Medication List  
  
CONTINUE these medications which have NOT CHANGED Details  
donepezil (ARICEPT) 10 mg tablet 10 mg nightly. losartan (COZAAR) 50 mg tablet One pill by mouth at bedtime. Qty: 30 Tab, Refills: 5  
  
aspirin delayed-release 81 mg tablet Take 81 mg by mouth daily. pravastatin (PRAVACHOL) 20 mg tablet Take 20 mg by mouth every evening. acetaminophen (TYLENOL EXTRA STRENGTH) 500 mg tablet Take 500 mg by mouth two (2) times a day. cyanocobalamin 1,000 mcg tablet Take 500 mcg by mouth daily. doxycycline (MONODOX) 100 mg capsule Take 1 Cap by mouth two (2) times a day. Qty: 14 Cap, Refills: 0 ED COURSE: The patient's hospital course has been uncomplicated.

## 2018-10-16 NOTE — ED TRIAGE NOTES
Patient was going to the bathroom and fell and hit her head at about 0330 this morning. Per daughter, patient walked into the walk in shower, through the curtain, hitting her head on the far wall and then falling. Patient has a small skin tear to the right elbow. Patient states no LOC. Per daughter, patient was Wardncar Evans that has been making her dizzy, but she stopped the medication on Sunday.

## 2018-10-16 NOTE — DISCHARGE INSTRUCTIONS
We hope that we have addressed all of your medical concerns. The examination and treatment you received in the Emergency Department were for an emergent problem and were not intended as complete care. It is important that you follow up with your healthcare provider(s) for ongoing care. If your symptoms worsen or do not improve as expected, and you are unable to reach your usual health care provider(s), you should return to the Emergency Department. Today's healthcare is undergoing tremendous change, and patient satisfaction surveys are one of the many tools to assess the quality of medical care. You may receive a survey from the [employer] regarding your experience in the Emergency Department. I hope that your experience has been completely positive, particularly the medical care that I provided. As such, please participate in the survey; anything less than excellent does not meet my expectations or intentions. [facility] and [facility] participate in nationally recognized quality of care measures. If your blood pressure is greater than 120/80, as reported below, we urge that you seek medical care to address the potential of high blood pressure, commonly known as hypertension. Hypertension can be hereditary or can be caused by certain medical conditions, pain, stress, or \"white coat syndrome. \"       Please make an appointment with your health care provider(s) for follow up of your Emergency Department visit. VITALS:   Patient Vitals for the past 8 hrs:   Temp Pulse Resp BP SpO2   10/16/18 0432 97.6 °F (36.4 °C) 83 18 167/89 95 %          Thank you for allowing us to provide you with medical care today. We realize that you have many choices for your emergency care needs. Please choose us in the future for any continued health care needs. Bridger Avila, [address] [place] [address] [address] Office: [phone]            No results found for this or any previous visit (from the past 24 hour(s)). Ct Head Wo Cont    Result Date: 10/16/2018  EXAM:  CT HEAD WO CONT INDICATION:   fall, head injury COMPARISON: November 15, 2017. CONTRAST:  None. TECHNIQUE: Unenhanced CT of the head was performed using 5 mm images. Brain and bone windows were generated. CT dose reduction was achieved through use of a standardized protocol tailored for this examination and automatic exposure control for dose modulation. Adaptive statistical iterative reconstruction (ASIR) was utilized. FINDINGS: The ventricles and sulci are stable in size, shape and configuration and midline. There is unchanged diffuse periventricular white matter disease. 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 There is no intracranial hemorrhage, extra-axial collection, mass, mass effect or midline shift. The basilar cisterns are open. No acute infarct is identified. The bone windows demonstrate no abnormalities. The visualized portions of the paranasal sinuses and mastoid air cells are clear. IMPRESSION: No acute intracranial process. No change from the prior study. Preventing Falls: Care Instructions  Your Care Instructions    Getting around your home safely can be a challenge if you have injuries or health problems that make it easy for you to fall. Loose rugs and furniture in walkways are among the dangers for many older people who have problems walking or who have poor eyesight. People who have conditions such as arthritis, osteoporosis, or dementia also have to be careful not to fall. You can make your home safer with a few simple measures. Follow-up care is a key part of your treatment and safety. Be sure to make and go to all appointments, and call your doctor if you are having problems. It's also a good idea to know your test results and keep a list of the medicines you take.   How can you care for yourself at home? Taking care of yourself  · You may get dizzy if you do not drink enough water. To prevent dehydration, drink plenty of fluids, enough so that your urine is light yellow or clear like water. Choose water and other caffeine-free clear liquids. If you have kidney, heart, or liver disease and have to limit fluids, talk with your doctor before you increase the amount of fluids you drink. · Exercise regularly to improve your strength, muscle tone, and balance. Walk if you can. Swimming may be a good choice if you cannot walk easily. · Have your vision and hearing checked each year or any time you notice a change. If you have trouble seeing and hearing, you might not be able to avoid objects and could lose your balance. · Know the side effects of the medicines you take. Ask your doctor or pharmacist whether the medicines you take can affect your balance. Sleeping pills or sedatives can affect your balance. · Limit the amount of alcohol you drink. Alcohol can impair your balance and other senses. · Ask your doctor whether calluses or corns on your feet need to be removed. If you wear loose-fitting shoes because of calluses or corns, you can lose your balance and fall. · Talk to your doctor if you have numbness in your feet. Preventing falls at home  · Remove raised doorway thresholds, throw rugs, and clutter. Repair loose carpet or raised areas in the floor. · Move furniture and electrical cords to keep them out of walking paths. · Use nonskid floor wax, and wipe up spills right away, especially on ceramic tile floors. · If you use a walker or cane, put rubber tips on it. If you use crutches, clean the bottoms of them regularly with an abrasive pad, such as steel wool. · Keep your house well lit, especially [place], and outside walkways. Use night-lights in areas such as hallways and bathrooms.  Add extra light switches or use remote switches (such as switches that go on or off when you clap your hands) to make it easier to turn lights on if you have to get up during the night. · Install sturdy handrails on stairways. · Move items in your cabinets so that the things you use a lot are on the lower shelves (about waist level). · Keep a cordless phone and a flashlight with new batteries by your bed. If possible, put a phone in each of the main rooms of your house, or carry a cell phone in case you fall and cannot reach a phone. Or, you can wear a device around your neck or wrist. You push a button that sends a signal for help. · Wear low-heeled shoes that fit well and give your feet good support. Use footwear with nonskid soles. Check the heels and soles of your shoes for wear. Repair or replace worn heels or soles. · Do not wear socks without shoes on wood floors. · Walk on the grass when the sidewalks are slippery. If you live in an area that gets snow and ice in the winter, sprinkle salt on slippery steps and sidewalks. Preventing falls in the bath  · Install grab bars and nonskid mats inside and outside your shower or tub and near the toilet and sinks. · Use shower chairs and bath benches. · Use a hand-held shower head that will allow you to sit while showering. · Get into a tub or shower by putting the weaker leg in first. Get out of a tub or shower with your strong side first.  · Repair loose toilet seats and consider installing a raised toilet seat to make getting on and off the toilet easier. · Keep your bathroom door unlocked while you are in the shower. Where can you learn more? Go to http://aquiles-leola.info/. Enter [id number] [id number] in the search box to learn more about \"Preventing Falls: Care Instructions. \"  Current as of: March 16, 2018  Content Version: 11.8  © 0150-3840 Healthwise, [employer]. Care instructions adapted under license by [employer] (which disclaims liability or warranty for this information).  If you have questions about a medical condition or this instruction, always ask your healthcare professional. [address] any warranty or liability for your use of this information. Head Injury: Care Instructions  Your Care Instructions    Most injuries to the head are minor. Bumps, cuts, and scrapes on the head and face usually heal well and can be treated the same as injuries to other parts of the body. Although it's rare, once in a while a more serious problem shows up after you are home. So it's good to be on the lookout for symptoms for a day or two. Follow-up care is a key part of your treatment and safety. Be sure to make and go to all appointments, and call your doctor if you are having problems. It's also a good idea to know your test results and keep a list of the medicines you take. How can you care for yourself at home? · Follow your doctor's instructions. He or she will tell you if you need someone to watch you closely for the next 24 hours or longer. · Take it easy for the next few days or more if you are not feeling well. · Ask your doctor when it's okay for you to go back to activities like driving a car, riding a bike, or operating machinery. When should you call for help? Call 911 anytime you think you may need emergency care. For example, call if:    · You have a seizure.     · You passed out (lost consciousness).     · You are confused or can't stay awake.    Call your doctor now or seek immediate medical care if:    · You have new or worse vomiting.     · You feel less alert.     · You have new weakness or numbness in any part of your body.    Watch closely for changes in your health, and be sure to contact your doctor if:    · You do not get better as expected.     · You have new symptoms, such as headaches, trouble concentrating, or changes in mood. Where can you learn more? Go to http://aquiles-leola.info/.   Enter U714 in the search box to learn more about \"Head Injury: Care Instructions. \"  Current as of: June 4, 2018  Content Version: 11.8  © 7413-7341 [employer] Care instructions adapted under license by [employer] (which disclaims liability or warranty for this information). If you have questions about a medical condition or this instruction, always ask your healthcare professional. [address] any warranty or liability for your use of this information. Contusion: Care Instructions  Your Care Instructions    Contusion is the medical term for a bruise. It is the result of a direct blow or an impact, such as a fall. Contusions are common sports injuries. Most people think of a bruise as a black-and-blue spot. This happens when small blood vessels get torn and leak blood under the skin. But bones, muscles, and organs can also get bruised. This may damage deep tissues but not cause a bruise you can see. The doctor will do a physical exam to find the location of your contusion. You may also have tests to make sure you do not have a more serious injury, such as a broken bone or nerve damage. These may include X-rays or other imaging tests like a CT scan or MRI. Deep-tissue contusions may cause pain and swelling. But if there is no serious damage, they will often get better in a few weeks with home treatment. The doctor has checked you carefully, but problems can develop later. If you notice any problems or new symptoms, get medical treatment right away. Follow-up care is a key part of your treatment and safety. Be sure to make and go to all appointments, and call your doctor if you are having problems. It's also a good idea to know your test results and keep a list of the medicines you take. How can you care for yourself at home? · Put ice or a cold pack on the sore area for 10 to 20 minutes at a time to stop swelling. Put a thin cloth between the ice pack and your skin. · Be safe with medicines.  Read and follow all instructions on the label. ¨ If the doctor gave you a prescription medicine for pain, take it as prescribed. ¨ If you are not taking a prescription pain medicine, ask your doctor if you can take an over-the-counter medicine. · If you can, prop up the sore area on pillows as much as possible for the next few days. Try to keep the sore area above the level of your heart. When should you call for help? Call your doctor now or seek immediate medical care if:    · Your pain gets worse.     · You have new or worse swelling.     · You have tingling, weakness, or numbness in the area near the contusion.     · The area near the contusion is cold or pale.    Watch closely for changes in your health, and be sure to contact your doctor if:    · You do not get better as expected. Where can you learn more? Go to http://aquilesTank Top TVleola.info/. Enter M259 in the search box to learn more about \"Contusion: Care Instructions. \"  Current as of: November 20, 2017  Content Version: 11.8  © 2283-9426 [employer]. Care instructions adapted under license by [employer] (which disclaims liability or warranty for this information). If you have questions about a medical condition or this instruction, always ask your healthcare professional. [address] any warranty or liability for your use of this information. Skin Tears: Care Instructions  Your Care Instructions  As we get older, our skin gets drier and more fragile. Sometimes this can cause the outer layers of skin to split and tear open. Skin tears are treated in different ways. In some cases, doctors use pieces of tape called Steri-Strips to pull the skin together and help it heal. Other times, it's best to leave the tear open and cover it with a special wound-care bandage. Skin tears are usually not serious. They usually heal in a few weeks.  But how long you take to heal depends on your body and the type of tear you have. Sometimes the torn piece of skin is used to protect the wound while it heals. But that piece of skin does not heal. It may fall off on its own. Or the doctor may remove it. As your tear heals, it's important to keep it clean to help prevent infection. The doctor has checked you carefully, but problems can develop later. If you notice any problems or new symptoms, get medical treatment right away. Follow-up care is a key part of your treatment and safety. Be sure to make and go to all appointments, and call your doctor if you are having problems. It's also a good idea to know your test results and keep a list of the medicines you take. How can you care for yourself at home? · If you have pain, ask your doctor if you can take an over-the-counter pain medicine, such as acetaminophen (Tylenol), ibuprofen (Advil, Motrin), or naproxen (Aleve). Be safe with medicines. Read and follow all instructions on the label. · If you have a bandage, follow your doctor's instructions for changing it. · If you have Steri-Strips, leave them on until they fall off. · Follow your doctor's instructions about bathing. · Gently wash the skin tear with plain water 2 times a day. Do not rub the area. · Let the area air dry. Or you can pat it carefully with a soft towel. When should you call for help? Call your doctor now or seek immediate medical care if:    · You have signs of infection, such as:  ¨ Increased pain, swelling, warmth, or redness around the tear. ¨ Red streaks leading from the tear. ¨ Pus draining from the tear. ¨ A fever.     · The tear starts to bleed a lot. Small amounts of blood are normal.    Watch closely for changes in your health, and be sure to contact your doctor if:    · You do not get better as expected. Where can you learn more? Go to http://aquiles-leola.info/.   Enter L460 in the search box to learn more about \"Skin Tears: Care Instructions. \"  Current as of: November 20, 2017  Content Version: 11.8  © 0065-1459 Healthwise, [facility]. Care instructions adapted under license by [employer] (which disclaims liability or warranty for this information). If you have questions about a medical condition or this instruction, always ask your healthcare professional. [place] [address] any warranty or liability for your use of this information.

## 2018-10-16 NOTE — ED NOTES
Skin tear cleaned with wound cleanser and Neosporin applied. Skin tear dressed with large band aid. Patient tolerated well. Patient and family aware of time constraints for imaging results. Patient and family verbalized good understanding. Will continue to monitor. Call bell within reach.

## 2018-10-26 ENCOUNTER — HOSPITAL ENCOUNTER (INPATIENT)
Age: 83
LOS: 4 days | Discharge: HOME HEALTH | DRG: 378 | End: 2018-10-30
Attending: EMERGENCY MEDICINE | Admitting: INTERNAL MEDICINE
Payer: MEDICARE

## 2018-10-26 ENCOUNTER — APPOINTMENT (OUTPATIENT)
Dept: CT IMAGING | Age: 83
DRG: 378 | End: 2018-10-26
Attending: INTERNAL MEDICINE
Payer: MEDICARE

## 2018-10-26 DIAGNOSIS — K62.5 RECTAL BLEEDING: Primary | ICD-10-CM

## 2018-10-26 PROBLEM — N18.30 CKD (CHRONIC KIDNEY DISEASE) STAGE 3, GFR 30-59 ML/MIN (HCC): Status: ACTIVE | Noted: 2018-10-26

## 2018-10-26 PROBLEM — H91.90 HARD OF HEARING: Status: ACTIVE | Noted: 2018-10-26

## 2018-10-26 PROBLEM — F03.90 DEMENTIA (HCC): Status: ACTIVE | Noted: 2018-10-26

## 2018-10-26 PROBLEM — K92.2 GI BLEED: Status: ACTIVE | Noted: 2018-10-26

## 2018-10-26 LAB
ANION GAP SERPL CALC-SCNC: 8 MMOL/L (ref 5–15)
APPEARANCE UR: CLEAR
BACTERIA URNS QL MICRO: NEGATIVE /HPF
BASOPHILS # BLD: 0 K/UL (ref 0–0.1)
BASOPHILS NFR BLD: 1 % (ref 0–1)
BILIRUB UR QL: NEGATIVE
BUN SERPL-MCNC: 24 MG/DL (ref 6–20)
BUN/CREAT SERPL: 20 (ref 12–20)
CALCIUM SERPL-MCNC: 9.4 MG/DL (ref 8.5–10.1)
CHLORIDE SERPL-SCNC: 103 MMOL/L (ref 97–108)
CO2 SERPL-SCNC: 28 MMOL/L (ref 21–32)
COLOR UR: ABNORMAL
CREAT SERPL-MCNC: 1.19 MG/DL (ref 0.55–1.02)
DIFFERENTIAL METHOD BLD: ABNORMAL
EOSINOPHIL # BLD: 0.1 K/UL (ref 0–0.4)
EOSINOPHIL NFR BLD: 2 % (ref 0–7)
EPITH CASTS URNS QL MICRO: ABNORMAL /LPF
ERYTHROCYTE [DISTWIDTH] IN BLOOD BY AUTOMATED COUNT: 13.4 % (ref 11.5–14.5)
GLUCOSE SERPL-MCNC: 103 MG/DL (ref 65–100)
GLUCOSE UR STRIP.AUTO-MCNC: NEGATIVE MG/DL
HCT VFR BLD AUTO: 34.8 % (ref 35–47)
HCT VFR BLD AUTO: 35.4 % (ref 35–47)
HCT VFR BLD AUTO: 39.1 % (ref 35–47)
HCT VFR BLD AUTO: 42.2 % (ref 35–47)
HEMOCCULT STL QL: POSITIVE
HGB BLD-MCNC: 11.2 G/DL (ref 11.5–16)
HGB BLD-MCNC: 11.6 G/DL (ref 11.5–16)
HGB BLD-MCNC: 12.5 G/DL (ref 11.5–16)
HGB BLD-MCNC: 13.8 G/DL (ref 11.5–16)
HGB UR QL STRIP: NEGATIVE
KETONES UR QL STRIP.AUTO: 15 MG/DL
LEUKOCYTE ESTERASE UR QL STRIP.AUTO: NEGATIVE
LYMPHOCYTES # BLD: 1.5 K/UL (ref 0.8–3.5)
LYMPHOCYTES NFR BLD: 19 % (ref 12–49)
MCH RBC QN AUTO: 33.1 PG (ref 26–34)
MCHC RBC AUTO-ENTMCNC: 32.7 G/DL (ref 30–36.5)
MCV RBC AUTO: 101.2 FL (ref 80–99)
MONOCYTES # BLD: 0.5 K/UL (ref 0–1)
MONOCYTES NFR BLD: 7 % (ref 5–13)
NEUTS SEG # BLD: 6 K/UL (ref 1.8–8)
NEUTS SEG NFR BLD: 71 % (ref 32–75)
NITRITE UR QL STRIP.AUTO: NEGATIVE
PH UR STRIP: 6 [PH] (ref 5–8)
PLATELET # BLD AUTO: 274 K/UL (ref 150–400)
PMV BLD AUTO: 10.9 FL (ref 8.9–12.9)
POTASSIUM SERPL-SCNC: 4.6 MMOL/L (ref 3.5–5.1)
PROT UR STRIP-MCNC: NEGATIVE MG/DL
RBC # BLD AUTO: 4.17 M/UL (ref 3.8–5.2)
RBC #/AREA URNS HPF: ABNORMAL /HPF (ref 0–5)
SODIUM SERPL-SCNC: 139 MMOL/L (ref 136–145)
SP GR UR REFRACTOMETRY: 1.02 (ref 1–1.03)
UR CULT HOLD, URHOLD: NORMAL
UROBILINOGEN UR QL STRIP.AUTO: 0.2 EU/DL (ref 0.2–1)
WBC # BLD AUTO: 8.3 K/UL (ref 3.6–11)
WBC URNS QL MICRO: ABNORMAL /HPF (ref 0–4)
XXWBCSUS: 0

## 2018-10-26 PROCEDURE — 74011250636 HC RX REV CODE- 250/636: Performed by: INTERNAL MEDICINE

## 2018-10-26 PROCEDURE — 80048 BASIC METABOLIC PNL TOTAL CA: CPT | Performed by: EMERGENCY MEDICINE

## 2018-10-26 PROCEDURE — 81001 URINALYSIS AUTO W/SCOPE: CPT | Performed by: EMERGENCY MEDICINE

## 2018-10-26 PROCEDURE — 82272 OCCULT BLD FECES 1-3 TESTS: CPT | Performed by: EMERGENCY MEDICINE

## 2018-10-26 PROCEDURE — 36415 COLL VENOUS BLD VENIPUNCTURE: CPT | Performed by: EMERGENCY MEDICINE

## 2018-10-26 PROCEDURE — 74011636320 HC RX REV CODE- 636/320: Performed by: INTERNAL MEDICINE

## 2018-10-26 PROCEDURE — 65660000000 HC RM CCU STEPDOWN

## 2018-10-26 PROCEDURE — 77030011943

## 2018-10-26 PROCEDURE — 86923 COMPATIBILITY TEST ELECTRIC: CPT | Performed by: EMERGENCY MEDICINE

## 2018-10-26 PROCEDURE — 85025 COMPLETE CBC W/AUTO DIFF WBC: CPT | Performed by: EMERGENCY MEDICINE

## 2018-10-26 PROCEDURE — 86900 BLOOD TYPING SEROLOGIC ABO: CPT | Performed by: EMERGENCY MEDICINE

## 2018-10-26 PROCEDURE — 74011250637 HC RX REV CODE- 250/637: Performed by: INTERNAL MEDICINE

## 2018-10-26 PROCEDURE — 85018 HEMOGLOBIN: CPT | Performed by: INTERNAL MEDICINE

## 2018-10-26 PROCEDURE — 99285 EMERGENCY DEPT VISIT HI MDM: CPT

## 2018-10-26 PROCEDURE — 74174 CTA ABD&PLVS W/CONTRAST: CPT

## 2018-10-26 RX ORDER — SODIUM CHLORIDE 9 MG/ML
250 INJECTION, SOLUTION INTRAVENOUS AS NEEDED
Status: DISCONTINUED | OUTPATIENT
Start: 2018-10-26 | End: 2018-10-28

## 2018-10-26 RX ORDER — PRAVASTATIN SODIUM 40 MG/1
40 TABLET ORAL
COMMUNITY

## 2018-10-26 RX ORDER — LANOLIN ALCOHOL/MO/W.PET/CERES
500 CREAM (GRAM) TOPICAL DAILY
Status: DISCONTINUED | OUTPATIENT
Start: 2018-10-27 | End: 2018-10-30 | Stop reason: HOSPADM

## 2018-10-26 RX ORDER — SODIUM CHLORIDE 9 MG/ML
75 INJECTION, SOLUTION INTRAVENOUS CONTINUOUS
Status: DISCONTINUED | OUTPATIENT
Start: 2018-10-26 | End: 2018-10-28

## 2018-10-26 RX ORDER — SODIUM CHLORIDE 0.9 % (FLUSH) 0.9 %
5-10 SYRINGE (ML) INJECTION EVERY 8 HOURS
Status: DISCONTINUED | OUTPATIENT
Start: 2018-10-26 | End: 2018-10-30 | Stop reason: HOSPADM

## 2018-10-26 RX ORDER — PRAVASTATIN SODIUM 20 MG/1
40 TABLET ORAL
Status: DISCONTINUED | OUTPATIENT
Start: 2018-10-26 | End: 2018-10-30 | Stop reason: HOSPADM

## 2018-10-26 RX ORDER — ACETAMINOPHEN 500 MG
500 TABLET ORAL 2 TIMES DAILY
Status: DISCONTINUED | OUTPATIENT
Start: 2018-10-26 | End: 2018-10-30 | Stop reason: HOSPADM

## 2018-10-26 RX ORDER — DONEPEZIL HYDROCHLORIDE 5 MG/1
10 TABLET, FILM COATED ORAL
Status: DISCONTINUED | OUTPATIENT
Start: 2018-10-26 | End: 2018-10-30 | Stop reason: HOSPADM

## 2018-10-26 RX ORDER — SODIUM CHLORIDE 0.9 % (FLUSH) 0.9 %
5-10 SYRINGE (ML) INJECTION AS NEEDED
Status: DISCONTINUED | OUTPATIENT
Start: 2018-10-26 | End: 2018-10-30 | Stop reason: HOSPADM

## 2018-10-26 RX ADMIN — SODIUM CHLORIDE 75 ML/HR: 900 INJECTION, SOLUTION INTRAVENOUS at 22:16

## 2018-10-26 RX ADMIN — SODIUM CHLORIDE 75 ML/HR: 900 INJECTION, SOLUTION INTRAVENOUS at 04:18

## 2018-10-26 RX ADMIN — PRAVASTATIN SODIUM 40 MG: 20 TABLET ORAL at 22:35

## 2018-10-26 RX ADMIN — IOPAMIDOL 80 ML: 755 INJECTION, SOLUTION INTRAVENOUS at 05:18

## 2018-10-26 RX ADMIN — ACETAMINOPHEN 500 MG: 500 TABLET ORAL at 22:33

## 2018-10-26 RX ADMIN — DONEPEZIL HYDROCHLORIDE 10 MG: 5 TABLET, FILM COATED ORAL at 22:34

## 2018-10-26 RX ADMIN — Medication 10 ML: at 22:37

## 2018-10-26 NOTE — ED NOTES
Dr. Joseph Rolon at bedside talking with pt and family. Per Dr. Joseph Rolon, no blood transfusion needed at this time, but is ordered in case pt needs it.

## 2018-10-26 NOTE — ED NOTES
Pt ambulated with assistance to the bathroom where she passed dark red blood. Pt was assisted back to her back and replaced on monitor.

## 2018-10-26 NOTE — ED NOTES
Pt asked to use the bedpan. Pt had one bowel movement that was dark red blood with clots that filled about 1/3 of the basin. Dr. Noah Martínez aware. Will continue to monitor.

## 2018-10-26 NOTE — PROGRESS NOTES
Case d/w Dr. Devan Reynoso. Chart reviewed. Patient examined. Had bloody BM this morning but things appear to have slowed down/stopped. Appreciate IR and GI. Agree if continues to bleed or if develops hemodynamic instability, then re-consult IR for angiography/embolization

## 2018-10-26 NOTE — PROGRESS NOTES
0725: Spoke with Noemi in angio/IR regarding consult for stat IR. She stated to have Dr. Linden Hi call radiologist at [phone] for physician to physical consult. Dr. Linden Hi updated. 65: Spoke with Dr. Momo Joy regarding IR consult. MD updated on vitals, rectal bleeding over night but none so far on my shift. MD states that if patient has bleeding again then to notify for possible intervention, and check hgb as ordered. Dr. Linden Hi also updated. 0845: Patient had a large red bloody bowel movement with clots. Dr. Linden Hi made aware. Attempting to reach Dr. Momo Joy now. 0920: Dr. Momo Joy in to see patient. Notify him if another bloody BM occurs

## 2018-10-26 NOTE — ED NOTES
Assumed care of pt from EMS that came from [facility] ER. Pt presents to ED with chief complaint of rectal bleeding. Pt is A&O x 4 with confusion per EMS that is pt's baseline. Pt denies any other symptoms at this time. Pt resting comfortably on the stretcher in a position of comfort. Pt in no acute distress at this time. Call bell within reach. Side rails x 2. Cardiac monitor x 3. Stretcher locked in the lowest position. Pt aware of plan to await for MD/PA-C/NP assessment, and pt/family verbalizes understanding. Will continue to monitor.

## 2018-10-26 NOTE — H&P
[facility] [address] [facility] 
[phone] Admission History and Physical 
 
 
NAME:  Hue Prasad :   1922 MRN:  [id number] PCP:  Ja Gtz MD  
 
Date/Time:  10/26/2018 Subjective: CHIEF COMPLAINT: rectal bleeding HISTORY OF PRESENT ILLNESS:    
Ms. Willow Hill is a 80 y.o.  female who is admitted with hematochezia. Ms. Willow Hill with PMH of dementia, HTN, diverticulosis, heart block s/p PM presented to ER c/o rectal bleeding, whcich started yesterday evening, which is bright red blood and painless. Had 3 times bloody stool during bowel movement. Never had similar symptoms in the past. Pt has dementia and unable to,provide good Hx. Past Medical History:  
Diagnosis Date  Diverticulitis  Hyperlipidemia  Hypertension  Ill-defined condition   
 pacemaker  Pacemaker  Psychiatric disorder   
 anxiety  Shingles Past Surgical History:  
Procedure Laterality Date  BREAST SURGERY PROCEDURE UNLISTED    
 cyst removal  
 HX APPENDECTOMY  HX OTHER SURGICAL  8/18/15 Medtronic dual chamber PPM  
 
 
Social History Tobacco Use  Smoking status: Never Smoker  Smokeless tobacco: Never Used Substance Use Topics  Alcohol use: No  
  Comment: rarely Family History Problem Relation Age of Onset  Cancer Mother No Known Allergies Prior to Admission medications Medication Sig Start Date End Date Taking? Authorizing Provider  
donepezil (ARICEPT) 10 mg tablet 10 mg nightly. 10/27/17  Yes Provider, Historical  
losartan (COZAAR) 50 mg tablet One pill by mouth at bedtime. 8/27/15  Yes Oniel Beckman NP  
aspirin delayed-release 81 mg tablet Take 81 mg by mouth daily. Yes Provider, Historical  
pravastatin (PRAVACHOL) 20 mg tablet Take 20 mg by mouth every evening.    Yes Other, MD Ashley  
 acetaminophen (TYLENOL EXTRA STRENGTH) 500 mg tablet Take 500 mg by mouth two (2) times a day. Yes Provider, Historical  
cyanocobalamin 1,000 mcg tablet Take 500 mcg by mouth daily. Yes Provider, Historical  
doxycycline (MONODOX) 100 mg capsule Take 1 Cap by mouth two (2) times a day. 6/10/18   Natalia Rosa MD  
 
 
 
Review of Systems: 
(bold if positive, if negative) Gen:  Eyes:  ENT:  CVS:  Pulm:  GI:   
:   
MS:  Skin:  Psych:  Endo:   
Hem:  Renal:   
Neuro:    
 
 
  
Objective: VITALS:   
Vital signs reviewed; most recent are: 
 
Visit Vitals /65 (BP 1 Location: Left arm, BP Patient Position: Sitting) Pulse 76 Temp 97.7 °F (36.5 °C) Resp 25 Wt 42.6 kg (94 lb) SpO2 100% BMI 18.36 kg/m² SpO2 Readings from Last 6 Encounters:  
10/26/18 100% 10/16/18 95% 06/10/18 97% 11/15/17 99% 10/26/16 (!) 9%  
06/29/16 98% No intake or output data in the 24 hours ending 10/26/18 0225 Exam:  
 
Physical Exam: 
 
Gen:  Well-developed, well-nourished, in no acute distress HEENT:  Pink conjunctivae, PERRL, hearing intact to voice, moist mucous membranes Neck:  Supple, without masses, thyroid non-tender Resp:  No accessory muscle use, clear breath sounds without wheezes rales or rhonchi 
Card:  Grade 3/6 systolic murmur, normal S1, S2 without thrills, bruits or peripheral edema Abd:  Soft, non-tender, non-distended, normoactive bowel sounds are present, no palpable organomegaly and no detectable hernias Lymph:  No cervical or inguinal adenopathy Musc:  No cyanosis or clubbing Skin:  No rashes or ulcers, skin turgor is good Neuro:  Cranial nerves are grossly intact, no focal motor weakness, follows commands appropriately Psych:  poor insight Labs: 
 
Recent Labs 10/26/18 
0127 WBC 8.3 HGB 13.8 HCT 42.2  Recent Labs 10/26/18 
0127   
K 4.6  CO2 28 * BUN 24* CREA 1.19* CA 9.4 Lab Results Component Value Date/Time Glucose (POC) 77 06/10/2018 04:10 PM  
 Glucose (POC) 102 06/29/2016 11:35 AM  
 Glucose (POC) 154 (H) 08/19/2015 02:16 PM  
 Glucose (POC) 127 (H) 11/23/2014 08:44 AM  
 
No results for input(s): PH, PCO2, PO2, HCO3, FIO2 in the last 72 hours. No results for input(s): INR in the last 72 hours. No lab exists for component: INREXT Telemetry reviewed:   paced Assessment/Plan: 1. Lower GI bleed (10/26/2018), pain less. Likely diverticular. Admit to tel;emetry. Check serial H/H, type and cross. (Signed consent is in chart). Keep NPO and consult GI. Hold ASA 2.  HTN (hypertension), benign (11/23/2014). Continue home med 3,  Hyperlipidemia (11/23/2014). on statin. I don't think it is beneficial at this age. Will stop( daughter agreed). .  
 
4.  Complete heart block ([address] (8/17/2015)/ s/p Pacemaker (10/26/2016). Monitor on telemetry 5. Anxiety (8/17/2015)/ Dementia (10/26/2018). Continue aricept. Xanax if needed. 6.  CKD stage 3. Stable. Monitor. 7.  Hard of hearing. Supportive care Addendum: 04:30 Pt has on going fresh red blood per rectum. Will check CTA of the abdomen and pelvis. 06:38. Called by radiologist and informed me about CTA finding, which is active bleeding from sigmoid diverticula. Stat IR consulted for possible embilization Code status: DNR( decision maker: daughter) Previous medical records reviewed Risk of deterioration: high Total time spent with patient: 70 Minutes Care Plan discussed with: Patient, Family, Nursing Staff and >50% of time spent in counseling and coordination of care Discussed:  Care Plan Prophylaxis:  SCD's 
 
Probable Disposition:  Home w/Family 
        
___________________________________________________ Attending Physician: Anthony Shelton MD

## 2018-10-26 NOTE — ROUTINE PROCESS
TRANSFER - OUT REPORT: 
 
Verbal report given to Alethea mota rn on [address]  being transferred to [facility]/ED for routine progression of care Report consisted of patients Situation, Background, Assessment and  
Recommendations(SBAR). Information from the following report(s) SBAR, Kardex, ED Summary, [facility] and Recent Results was reviewed with the receiving nurse. Lines:  
Peripheral IV 10/26/18 Right Antecubital (Active) Site Assessment Clean, dry, & intact 10/26/2018  1:33 AM  
Phlebitis Assessment 0 10/26/2018  1:33 AM  
Infiltration Assessment 0 10/26/2018  1:33 AM  
Dressing Status Clean, dry, & intact 10/26/2018  1:33 AM  
Dressing Type Tape;Transparent 10/26/2018  1:33 AM  
Hub Color/Line Status Pink 10/26/2018  1:33 AM  
  
 
Opportunity for questions and clarification was provided.    
 
Patient transported with: 
 Monitor, 20g in [facility]

## 2018-10-26 NOTE — ED NOTES
Pt report given to Dana Wiggins rn. Pt report given to HERVE. Emtala, summary, and facesheet given to [facility]. Pt will travel on monitor with 20g in rt ac. Family to follow squad. Pt will be transported to [facility]/ED. Discharge or Transfer Assessment: Patient A&O x 1 and in no distress. Physical re-examination reveals  improvement in pt's condition with reassessment of vital signs completed at the time of admission transfer and/or discharge.

## 2018-10-26 NOTE — ED NOTES
Report given to Tiffany Juares RN on [address] [facility][1] at shift change for routine progression of care. Report consisted of patients Situation, Background, Assessment and Recommendations(SBAR). Information from the following report(s) SBAR, ED Summary, [facility] and Recent Results was reviewed with the receiving nurse. Opportunity for questions and clarification was provided. Pt in stable condition. Pt's VSS.

## 2018-10-26 NOTE — CONSULTS
[facility]. [facility] Lynette Gentile M.D. 
[phone] GASTROENTEROLOGY CONSULTATION NOTE   
 
 
 
 
NAME:  Hue Prasad :   1922 MRN:   [id number] Referring Physician:  
 Dr. Devan Reynoso Consult Date:  
10/26/2018 10:58 AM 
 
Chief Complaint:   
Hematochezia History of Present Illness:   
Patient is a 80 y.o. who was brought to the ER for acute onset bright red blood per rectum that started yesterday evening and has recurred this morning. She is unable to provide history due to dementia, history obtained from chart and nursing. No reports of abdominal pain, fever or chills and she has remained hemodynamically stable since arrival. A CTA abdomen and pelvis was obtained this morning and showed findings consistent with bleeding in the sigmoid colon with evidence of diverticula. She denies any previous similar episodes. She is comfortable and denies any pain. PMH: 
Past Medical History:  
Diagnosis Date  Diverticulitis  Hyperlipidemia  Hypertension  Ill-defined condition   
 pacemaker  Pacemaker  Psychiatric disorder   
 anxiety  Shingles PSH: 
Past Surgical History:  
Procedure Laterality Date  BREAST SURGERY PROCEDURE UNLISTED    
 cyst removal  
 HX APPENDECTOMY  HX OTHER SURGICAL  8/18/15 Medtronic dual chamber PPM  
 
 
Allergies: 
No Known Allergies Home Medications: 
Prior to Admission Medications Prescriptions Last Dose Informant Patient Reported? Taking?  
acetaminophen (TYLENOL EXTRA STRENGTH) 500 mg tablet 10/26/2018 at Unknown time Significant Other Yes Yes Sig: Take 500 mg by mouth two (2) times a day. aspirin delayed-release 81 mg tablet 10/26/2018 at Unknown time  Yes Yes Sig: Take 81 mg by mouth daily. cyanocobalamin 1,000 mcg tablet 10/26/2018 at Unknown time Significant Other Yes Yes Sig: Take 500 mcg by mouth daily. donepezil (ARICEPT) 10 mg tablet 10/26/2018 at Unknown time  Yes Yes Sig: 10 mg nightly. doxycycline (MONODOX) 100 mg capsule Not Taking at Unknown time  No No  
Sig: Take 1 Cap by mouth two (2) times a day. losartan (COZAAR) 50 mg tablet 10/26/2018 at Unknown time  No Yes Sig: One pill by mouth at bedtime. Facility-Administered Medications: None Hospital Medications: 
Current Facility-Administered Medications Medication Dose Route Frequency  sodium chloride (NS) flush 5-10 mL  5-10 mL IntraVENous Q8H  
 sodium chloride (NS) flush 5-10 mL  5-10 mL IntraVENous PRN  
 0.9% sodium chloride infusion  75 mL/hr IntraVENous CONTINUOUS  
 0.9% sodium chloride infusion 250 mL  250 mL IntraVENous PRN  
 0.9% sodium chloride infusion 250 mL  250 mL IntraVENous PRN Current Outpatient Medications Medication Sig  
 donepezil (ARICEPT) 10 mg tablet 10 mg nightly.  losartan (COZAAR) 50 mg tablet One pill by mouth at bedtime.  aspirin delayed-release 81 mg tablet Take 81 mg by mouth daily.  acetaminophen (TYLENOL EXTRA STRENGTH) 500 mg tablet Take 500 mg by mouth two (2) times a day.  cyanocobalamin 1,000 mcg tablet Take 500 mcg by mouth daily.  doxycycline (MONODOX) 100 mg capsule Take 1 Cap by mouth two (2) times a day. Social History: 
Social History Tobacco Use  Smoking status: Never Smoker  Smokeless tobacco: Never Used Substance Use Topics  Alcohol use: No  
  Comment: rarely Family History: 
Family History Problem Relation Age of Onset  Cancer Mother Review of Systems: 
Constitutional: negative fever, negative chills, negative weight loss Eyes:   negative visual changes ENT:   negative sore throat, tongue or lip swelling Respiratory:  negative cough, negative dyspnea Cards:  negative for chest pain, palpitations, lower extremity edema GI:   See HPI 
:  negative for frequency, dysuria Integument:  negative for rash and pruritus Heme:  negative for easy bruising and gum/nose bleeding Musculoskel: negative for myalgias,  back pain and muscle weakness Neuro: negative for headaches, dizziness, vertigo Psych:  negative for feelings of anxiety, depression Objective:  
 
Patient Vitals for the past 8 hrs: 
 BP Temp Pulse Resp SpO2  
10/26/18 0954 117/83  75    
10/26/18 0800 (!) 118/99 98 °F (36.7 °C) 72 20 100 % 10/26/18 0730 106/71  72 17 98 % 10/26/18 0707   73 14 99 % 10/26/18 0703 (!) 129/97    100 % 10/26/18 0659   75 19 100 % 10/26/18 0645 125/70  66 13 100 % 10/26/18 0640   70 16 100 % 10/26/18 0632   70 11 100 % 10/26/18 0631 120/70      
10/26/18 0615 112/76  68 15 100 % 10/26/18 0604   69 19 98 % 10/26/18 0600 124/52  71 20 100 % 10/26/18 0547   72 21 100 % 10/26/18 0546 113/87    98 % 10/26/18 0531 (!) 103/91  76 17 99 % 10/26/18 0445 98/55  73 20 98 % 10/26/18 0430 128/57  77 21 98 % 10/26/18 0420   76 18 100 % 10/26/18 0419 135/71    100 % 10/26/18 0353 143/67  81 16 99 % 10/26/18 0334 142/81 97.5 °F (36.4 °C) 77 16 97 % 10/26/18 0306 (!) 138/93 97.9 °F (36.6 °C) 76 16 99 % 10/26/18 0300 123/66  76 18 99 % No intake/output data recorded. No intake/output data recorded. EXAM:   
 NEURO-awake and alert, demented, no focal neurological deficits, moves all extremities well HEENT-Head: Normocephalic, no lesions, without obvious abnormality. LUNGS-clear to auscultation bilaterally COR-regular rate and rhythym ABD- soft, non-tender. Bowel sounds normal. No masses,  no organomegaly EXT-no edema Skin - No rash Data Review Recent Labs 10/26/18 
3085 10/26/18 
0127 WBC  --  8.3 HGB 12.5 13.8 HCT 39.1 42.2 PLT  --  274 Recent Labs 10/26/18 
0127   
K 4.6  CO2 28 BUN 24* CREA 1.19* * CA 9.4 No results for input(s): SGOT, GPT, AP, TBIL, TP, ALB, GLOB, GGT, AML, LPSE in the last 72 hours. No lab exists for component: AMYP, HLPSE No results for input(s): INR, PTP, APTT in the last 72 hours. No lab exists for component: INREXT Patient Active Problem List  
Diagnosis Code  
 HTN (hypertension), benign I10  
 Hyperlipidemia E78.5  
 H/O herpes zoster Z86.19  
 Convulsive syncope R55  UTI (lower urinary tract infection) N39.0  Complete heart block (HCC) I44.2  Syncope R55  Anxiety F41.9  
 Pacemaker Z95.0  
 GI bleed K92.2  Dementia F03.90  
 CKD (chronic kidney disease) stage 3, GFR 30-59 ml/min (HCC) N18.3  Hard of hearing H91.90 Assessment and Plan: 
Painless hematochezia appears to be originating in the sigmoid colon most likely due to diverticulosis. In the absence of hemodynamic instability and mild drop in hemoglobin, would continue to monitor at this point as bleeding usually stop spontaneously. However if bleeding is recurrent or hemodynamic instability, will have to proceed with emobolization by IR then. Agree with NPO for now, continue to monitor for bleeding recurrence and monitor hemoglobin. Type and screen 2 units PRBCs. No plans for endoscopic evaluation at this point. Will follow with you. Thanks for allowing me to participate in the care of this patient.  
Signed By: Kip Dunbar MD   
 10/26/2018  10:58 AM

## 2018-10-26 NOTE — ED NOTES
Pt had another bowel movement that was dark red blood with some clots and some justin of stool. RN cleaned pt and provided pt with new brief and pad. Dr. Sanchez Pepper aware.

## 2018-10-26 NOTE — PROGRESS NOTES
Bedside and Verbal shift change report given to Shahla Noel RN (oncoming nurse) by Vania Erickson RN (offgoing nurse). Report included the following information SBAR, Kardex, MAR and Recent Results.

## 2018-10-26 NOTE — PROGRESS NOTES
[facility]: MED RECONCILIATION Comments/Recommendations:  
 
Medications added: · Pravastatin 40 mg 
 
Medications removed: 
 
· doxycycline Medications adjusted: 
 
· none Information obtained from: family member Allergies: Patient has no known allergies. Prior to Admission Medications:  
 
Medication Documentation Review Audit Reviewed by VÍCTOR Bro (Pharmacist) on 10/26/18 at [phone] Medication Sig Documenting Provider Last Dose Status Taking?  
acetaminophen (TYLENOL EXTRA STRENGTH) 500 mg tablet Take 500 mg by mouth two (2) times a day. Provider, Historical 10/25/2018 Unknown time Active Yes  
aspirin delayed-release 81 mg tablet Take 81 mg by mouth daily. Provider, Historical 10/25/2018 AM Active Yes  
cyanocobalamin 1,000 mcg tablet Take 500 mcg by mouth daily. Provider, Historical 10/25/2018 AM Active Yes  
donepezil (ARICEPT) 10 mg tablet 10 mg nightly. Provider, Historical 10/25/2018 PM Active Yes Med Note (Alledy Paulie   Wed Nov 8, 2017  2:25 PM) Received from: External Pharmacy  
losartan (COZAAR) 50 mg tablet One pill by mouth at bedtime. Guilherme Guzmán NP 10/25/2018 AM Active Yes  
pravastatin (PRAVACHOL) 40 mg tablet Take 40 mg by mouth nightly. Provider, Historical 10/25/2018 PM Active Yes Antonio Montana, PHARMD

## 2018-10-26 NOTE — ED TRIAGE NOTES
Family states that pt has had 3 bloody stools this evening consisting of bright red blood. Family states that pt has never had this issue before now. Pt has had no vomiting or diarrhea. Pt on monitor x3. Family at bedside. Call bell within reach.

## 2018-10-27 ENCOUNTER — APPOINTMENT (OUTPATIENT)
Dept: INTERVENTIONAL RADIOLOGY/VASCULAR | Age: 83
DRG: 378 | End: 2018-10-27
Attending: INTERNAL MEDICINE
Payer: MEDICARE

## 2018-10-27 LAB
BASOPHILS # BLD: 0.1 K/UL (ref 0–0.1)
BASOPHILS NFR BLD: 1 % (ref 0–1)
DIFFERENTIAL METHOD BLD: ABNORMAL
EOSINOPHIL # BLD: 0.2 K/UL (ref 0–0.4)
EOSINOPHIL NFR BLD: 2 % (ref 0–7)
ERYTHROCYTE [DISTWIDTH] IN BLOOD BY AUTOMATED COUNT: 13.7 % (ref 11.5–14.5)
HCT VFR BLD AUTO: 31.3 % (ref 35–47)
HCT VFR BLD AUTO: 34.6 % (ref 35–47)
HGB BLD-MCNC: 10.1 G/DL (ref 11.5–16)
HGB BLD-MCNC: 11.3 G/DL (ref 11.5–16)
HGB BLD-MCNC: 11.7 G/DL (ref 11.5–16)
IMM GRANULOCYTES # BLD: 0 K/UL (ref 0–0.04)
IMM GRANULOCYTES NFR BLD AUTO: 0 % (ref 0–0.5)
LYMPHOCYTES # BLD: 1.5 K/UL (ref 0.8–3.5)
LYMPHOCYTES NFR BLD: 18 % (ref 12–49)
MCH RBC QN AUTO: 32.7 PG (ref 26–34)
MCHC RBC AUTO-ENTMCNC: 32.3 G/DL (ref 30–36.5)
MCV RBC AUTO: 101.3 FL (ref 80–99)
MONOCYTES # BLD: 0.4 K/UL (ref 0–1)
MONOCYTES NFR BLD: 5 % (ref 5–13)
NEUTS SEG # BLD: 6.2 K/UL (ref 1.8–8)
NEUTS SEG NFR BLD: 75 % (ref 32–75)
NRBC # BLD: 0 K/UL (ref 0–0.01)
NRBC BLD-RTO: 0 PER 100 WBC
PLATELET # BLD AUTO: 265 K/UL (ref 150–400)
PMV BLD AUTO: 11 FL (ref 8.9–12.9)
RBC # BLD AUTO: 3.09 M/UL (ref 3.8–5.2)
WBC # BLD AUTO: 8.3 K/UL (ref 3.6–11)

## 2018-10-27 PROCEDURE — C1894 INTRO/SHEATH, NON-LASER: HCPCS

## 2018-10-27 PROCEDURE — 36248 INS CATH ABD/L-EXT ART ADDL: CPT

## 2018-10-27 PROCEDURE — C9113 INJ PANTOPRAZOLE SODIUM, VIA: HCPCS | Performed by: INTERNAL MEDICINE

## 2018-10-27 PROCEDURE — 74011636320 HC RX REV CODE- 636/320: Performed by: RADIOLOGY

## 2018-10-27 PROCEDURE — 77030004530 HC CATH ANGI DX IMGR BSC -A

## 2018-10-27 PROCEDURE — 74011250636 HC RX REV CODE- 250/636: Performed by: INTERNAL MEDICINE

## 2018-10-27 PROCEDURE — 76937 US GUIDE VASCULAR ACCESS: CPT

## 2018-10-27 PROCEDURE — 36415 COLL VENOUS BLD VENIPUNCTURE: CPT | Performed by: INTERNAL MEDICINE

## 2018-10-27 PROCEDURE — P9016 RBC LEUKOCYTES REDUCED: HCPCS | Performed by: EMERGENCY MEDICINE

## 2018-10-27 PROCEDURE — 77030019698 HC SYR ANGI MDLON MRTM -A

## 2018-10-27 PROCEDURE — 85025 COMPLETE CBC W/AUTO DIFF WBC: CPT | Performed by: INTERNAL MEDICINE

## 2018-10-27 PROCEDURE — 85018 HEMOGLOBIN: CPT | Performed by: INTERNAL MEDICINE

## 2018-10-27 PROCEDURE — 74011250636 HC RX REV CODE- 250/636: Performed by: RADIOLOGY

## 2018-10-27 PROCEDURE — C1769 GUIDE WIRE: HCPCS

## 2018-10-27 PROCEDURE — B4151ZZ FLUOROSCOPY OF INFERIOR MESENTERIC ARTERY USING LOW OSMOLAR CONTRAST: ICD-10-PCS | Performed by: RADIOLOGY

## 2018-10-27 PROCEDURE — 36246 INS CATH ABD/L-EXT ART 2ND: CPT

## 2018-10-27 PROCEDURE — 30233N1 TRANSFUSION OF NONAUTOLOGOUS RED BLOOD CELLS INTO PERIPHERAL VEIN, PERCUTANEOUS APPROACH: ICD-10-PCS | Performed by: HOSPITALIST

## 2018-10-27 PROCEDURE — C1760 CLOSURE DEV, VASC: HCPCS

## 2018-10-27 PROCEDURE — 74011250637 HC RX REV CODE- 250/637: Performed by: INTERNAL MEDICINE

## 2018-10-27 PROCEDURE — 65610000006 HC RM INTENSIVE CARE

## 2018-10-27 PROCEDURE — C1887 CATHETER, GUIDING: HCPCS

## 2018-10-27 PROCEDURE — 36430 TRANSFUSION BLD/BLD COMPNT: CPT

## 2018-10-27 PROCEDURE — 94760 N-INVAS EAR/PLS OXIMETRY 1: CPT

## 2018-10-27 PROCEDURE — 85014 HEMATOCRIT: CPT | Performed by: SURGERY

## 2018-10-27 PROCEDURE — 75774 ARTERY X-RAY EACH VESSEL: CPT

## 2018-10-27 PROCEDURE — 77030029065 HC DRSG HEMO QCLOT ZMED -B

## 2018-10-27 RX ORDER — DIPHENHYDRAMINE HYDROCHLORIDE 50 MG/ML
INJECTION, SOLUTION INTRAMUSCULAR; INTRAVENOUS
Status: DISPENSED
Start: 2018-10-27 | End: 2018-10-28

## 2018-10-27 RX ORDER — HYDRALAZINE HYDROCHLORIDE 20 MG/ML
10 INJECTION INTRAMUSCULAR; INTRAVENOUS
Status: DISCONTINUED | OUTPATIENT
Start: 2018-10-27 | End: 2018-10-30 | Stop reason: HOSPADM

## 2018-10-27 RX ORDER — SODIUM CHLORIDE 9 MG/ML
250 INJECTION, SOLUTION INTRAVENOUS AS NEEDED
Status: DISCONTINUED | OUTPATIENT
Start: 2018-10-27 | End: 2018-10-28

## 2018-10-27 RX ORDER — LIDOCAINE HYDROCHLORIDE 10 MG/ML
10-30 INJECTION INFILTRATION; PERINEURAL
Status: DISCONTINUED | OUTPATIENT
Start: 2018-10-27 | End: 2018-10-27 | Stop reason: HOSPADM

## 2018-10-27 RX ORDER — MIDAZOLAM HYDROCHLORIDE 1 MG/ML
.5-1 INJECTION, SOLUTION INTRAMUSCULAR; INTRAVENOUS
Status: DISCONTINUED | OUTPATIENT
Start: 2018-10-27 | End: 2018-10-27 | Stop reason: HOSPADM

## 2018-10-27 RX ORDER — FENTANYL CITRATE 50 UG/ML
25-200 INJECTION, SOLUTION INTRAMUSCULAR; INTRAVENOUS
Status: DISCONTINUED | OUTPATIENT
Start: 2018-10-27 | End: 2018-10-27 | Stop reason: HOSPADM

## 2018-10-27 RX ORDER — DIPHENHYDRAMINE HYDROCHLORIDE 50 MG/ML
25-50 INJECTION, SOLUTION INTRAMUSCULAR; INTRAVENOUS ONCE
Status: COMPLETED | OUTPATIENT
Start: 2018-10-27 | End: 2018-10-27

## 2018-10-27 RX ADMIN — PRAVASTATIN SODIUM 40 MG: 20 TABLET ORAL at 21:15

## 2018-10-27 RX ADMIN — Medication 10 ML: at 15:01

## 2018-10-27 RX ADMIN — Medication 10 ML: at 01:25

## 2018-10-27 RX ADMIN — FENTANYL CITRATE 25 MCG: 50 INJECTION, SOLUTION INTRAMUSCULAR; INTRAVENOUS at 12:47

## 2018-10-27 RX ADMIN — LIDOCAINE HYDROCHLORIDE 10 ML: 10 INJECTION, SOLUTION INFILTRATION; PERINEURAL at 12:51

## 2018-10-27 RX ADMIN — IOPAMIDOL 50 ML: 755 INJECTION, SOLUTION INTRAVENOUS at 13:29

## 2018-10-27 RX ADMIN — DIPHENHYDRAMINE HYDROCHLORIDE 25 MG: 50 INJECTION, SOLUTION INTRAMUSCULAR; INTRAVENOUS at 12:45

## 2018-10-27 RX ADMIN — ACETAMINOPHEN 500 MG: 500 TABLET ORAL at 18:15

## 2018-10-27 RX ADMIN — DONEPEZIL HYDROCHLORIDE 10 MG: 5 TABLET, FILM COATED ORAL at 21:15

## 2018-10-27 RX ADMIN — FENTANYL CITRATE 25 MCG: 50 INJECTION, SOLUTION INTRAMUSCULAR; INTRAVENOUS at 13:01

## 2018-10-27 RX ADMIN — PANTOPRAZOLE SODIUM 40 MG: 40 INJECTION, POWDER, FOR SOLUTION INTRAVENOUS at 18:16

## 2018-10-27 RX ADMIN — Medication 10 ML: at 21:15

## 2018-10-27 RX ADMIN — SODIUM CHLORIDE 75 ML/HR: 900 INJECTION, SOLUTION INTRAVENOUS at 07:33

## 2018-10-27 NOTE — PROGRESS NOTES
Called to bedside as pt had blood BM on toilet. Significant amount of dark/red blood with clots. Near syncope, diaphoretic. Less responsive. BP 624E systolic, dropping. HR ~100. Clammy. Active bleeding noted. 2 large bore IVs. 
Volume resuscitate, transfuse pRBC now. Called IR for angiography/embolization, Dr. Marissa Burleson on his way. Asked to consult gen surgery for back up in case unable to embolize. Discussed with son-in-law, (daughter not available but on her way). He said they would be okay proceeding with procedure and even surgery if needed. Discussed with gen surgery. ADDENDUM: 
Transferred to ICU. Discussed with intensivist.  
Patient on her way to IR. 
 
~30 minutes CC spent, including coordination of care Critical Care:  Time spent ~ 30 minutes. The reason for providing this level of medical care for this critically ill patient was due to a critical illness (acute GI bleed with instability) that impaired one or more vital organ systems such that there was a high probability of imminent or life threatening deterioration in the patients condition. This care involved high complexity decision making to assess, manipulate, and support vital system functions. Addendum:  
Discussed with IR, no active extravasation demonstrated so did not embolize. Repeat Hg improved to 11.7. Hold on second unit of pRBC. Continue to watch in ICU with serial Hg and monitor for bleeding.

## 2018-10-27 NOTE — PROGRESS NOTES
Telephonic report given to SAEID Malik SBAR, Recent Events, Cardiac Rhythm, Labs and Plan discussed. Unit of blood to be transfused has arrived and we will transport and hand to RN.

## 2018-10-27 NOTE — PROGRESS NOTES
TRANSFER - IN REPORT: 
 
Verbal report received from JOSE Malik on [address]  being received from [facility](unit) for routine progression of care Report consisted of patients Situation, Background, Assessment and  
Recommendations(SBAR). Information from the following report(s) SBAR was reviewed with the receiving nurse. Opportunity for questions and clarification was provided. Assessment completed upon patients arrival to unit and care assumed. Called Lab regarding Hemoglobin pending, per lab not collected. Called JOSE Malik she advised that [facility] didn't collect a sample and blood was already being transfused. Dr. Miki archuleta

## 2018-10-27 NOTE — ROUTINE PROCESS
TRANSFER - OUT REPORT: 
 
Verbal report given to Angeles Link RN(name) on Fredrick Locus  being transferred to [facility](unit) for routine progression of care Report consisted of patients Situation, Background, Assessment and  
Recommendations(SBAR). Information from the following report(s) ED Summary was reviewed with the receiving nurse. Lines:  
Peripheral IV 10/26/18 Right Antecubital (Active) Site Assessment Clean, dry, & intact 10/26/2018  8:30 AM  
Phlebitis Assessment 0 10/26/2018  8:30 AM  
Infiltration Assessment 0 10/26/2018  8:30 AM  
Dressing Status Clean, dry, & intact 10/26/2018  8:30 AM  
Dressing Type Transparent;Tape 10/26/2018  8:30 AM  
Hub Color/Line Status Pink; Infusing 10/26/2018  8:30 AM  
  
 
Opportunity for questions and clarification was provided. Patient transported with: 
 [employer]

## 2018-10-27 NOTE — PROGRESS NOTES
Patient was ambulated by two staff to bathroom. Passed large gelatinous clots in the commode. Dr. Heywood Homans inspected once notified. Upon return to bed with two staff patient became limp and unresponsive with Dr. Heywood Homans present. Additional staff summoned to return patient to bed. Normal saline was opened wide for gravity  bolus of remaining 700ml per bedside verbal order of Dr. Heywood Homans. Dr Heywood Homans requested one unit PRBC to be transfused STAT. Conscious ness regained and stated \"she feels bad\"  ICU transfer placed by Dr. Heywood Homans.

## 2018-10-27 NOTE — PROGRESS NOTES
Bedside shift change report given to Lucila Mccrary (oncoming nurse) by Josh Boston RN (offgoing nurse). Report included the following information SBAR, Intake/Output, MAR, Accordion and Recent Results. 0000 No changes, VSS 
 
0400 Pt sleeping, awakes for assessment. Shift summary: pt slept between assessments, VSS, niece at bedside all night. Bedside shift change report given to Libertad RN (oncoming nurse) by Xu Mayr RN (offgoing nurse). Report included the following information SBAR, Intake/Output, MAR, Accordion and Recent Results.

## 2018-10-27 NOTE — CONSULTS
[facility] DAILY PROGRESS NOTE Name: Angela Celis : 1922 MRN: [id number] Date: 10/27/2018 12:00 PM  
I have reviewed the flowsheet and previous days notes. The patient is a 81 yo female admitted early yesterday with lower GI bleeding. Work up including CT scan suggest sigmoid diverticular bleed. She was doing well without significant bleeding until today when developed red bleeding with BM. She was pre-syncopal.  She was transferred to the icu for monitoring. PRBCs started. Interventional radiology and surgery have been called. Currently sats 96% on room air. /84. She is alert but not able to give any history (has dementia noted ?baseline). She does report soreness in her abdomen. Per hospitalist she was more interactive yesterday. PMH: diverticulitis, htn, pacemaker, dementia Social:nonsmoker FH: not pertinent ROS: unable to obtain due to mental status Vital Signs:   
Visit Vitals /62 (BP 1 Location: Left arm, BP Patient Position: At rest) Pulse 78 Temp 96.1 °F (35.6 °C) Resp 16 Wt 45.6 kg (100 lb 9.6 oz) SpO2 100% BMI 19.65 kg/m² O2 Device: Room air Temp (24hrs), Av.7 °F (36.5 °C), Min:96.1 °F (35.6 °C), Max:98.6 °F (37 °C) Intake/Output:  
Last shift:      No intake/output data recorded. Last 3 shifts: 10/25 1901 - 10/27 0700 In: 1998.8 [I.V.:1998.8] Out: 400 [Urine:400] Intake/Output Summary (Last 24 hours) at 10/27/2018 1200 Last data filed at 10/27/2018 6791 Gross per 24 hour Intake 1998.75 ml Output 400 ml Net 1598.75 ml Physical Exam: 
General:  Awake, frail, not distress Head:  Normocephalic Eyes:  EOMs intact. Nose: Nares normal.  
On room air. Throat: Lips normal.  
   
   
Lungs:   Clear to auscultation bilaterally. Chest wall:  No tenderness or deformity. Heart:  Regular rate and rhythm Abdomen:   Soft, tender to palpation Extremities: No edema. Muscle wasting. Skin: Warm, dry Neurologic: Awake but not interactive DATA:  
Current Facility-Administered Medications Medication Dose Route Frequency  0.9% sodium chloride infusion 250 mL  250 mL IntraVENous PRN  
 sodium chloride (NS) flush 5-10 mL  5-10 mL IntraVENous Q8H  
 sodium chloride (NS) flush 5-10 mL  5-10 mL IntraVENous PRN  
 0.9% sodium chloride infusion  75 mL/hr IntraVENous CONTINUOUS  
 0.9% sodium chloride infusion 250 mL  250 mL IntraVENous PRN  
 0.9% sodium chloride infusion 250 mL  250 mL IntraVENous PRN  
 cyanocobalamin (VITAMIN B12) tablet 500 mcg  500 mcg Oral DAILY  donepezil (ARICEPT) tablet 10 mg  10 mg Oral QHS  pravastatin (PRAVACHOL) tablet 40 mg  40 mg Oral QHS  acetaminophen (TYLENOL) tablet 500 mg  500 mg Oral BID Telemetry: paced rhythm Labs: 
Recent Labs 10/27/18 
0131 10/26/18 
1921 10/26/18 
1130  10/26/18 
0127 WBC 8.3  --   --   --  8.3 HGB 10.1* 11.2* 11.6   < > 13.8 HCT 31.3* 34.8* 35.4   < > 42.2   --   --   --  274  
 < > = values in this interval not displayed. Recent Labs 10/26/18 
0127   
K 4.6  CO2 28 * BUN 24* CREA 1.19* CA 9.4 No results for input(s): PH, PCO2, PO2, HCO3, FIO2 in the last 72 hours. Imaging:  abd CT with sigmoid diverticuli with evidence of sigmoid bleeding IMPRESSION:  
· Lower GI bleed, diverticular. Currently stable hemodynamics · Dementia PLAN:  
· PRBC transfusion being given · Interventional to see for possible embolization · Serial Hgb · Surgical consulted for back up · Watch in ICU · The pt is critically ill.  
 
My assessment/plan was discussed with: nurse, hospitalist 
 
 
 
Cristofer Da Silva MD

## 2018-10-27 NOTE — PROGRESS NOTES
IR 
ADILENE-mesenteric arteriogram using right femoral artery access without complication and less that 30 ml blood loss. Angio included oblique projections and sub selective injections supplying the location of the proximal sigmoid colon as demonstrated on yesterdays abdominal gi hemorrhage CT scan. No active extravasation demonstrated so did not embolize. Hemostasis at groin aided by use of Minx. Back to ICU stable condition

## 2018-10-27 NOTE — CONSULTS
Surgery Consult: 
 
 
Subjective:  
Patient 80 y.o. woman who presented to the ER yesterday with acute GI bleed. She was noted on CT to have active extravasation in the proximal sigmoid. IR elected not to intervene as she had appeared to stop bleeding by the time of their evaluation. Per her daughter she had a similar episode about 20 years ago that did not require intervention. She had another episode this morning with passage of large clot and bright red blood. She reported feeling lightheaded at the time of the incident. IR has been consulted this am for possible coiling. I was asked to evaluate in case IR was not able to control the bleeding. Past Medical & Surgical History: 
Past Medical History:  
Diagnosis Date  Diverticulitis  Hyperlipidemia  Hypertension  Ill-defined condition   
 pacemaker  Pacemaker  Psychiatric disorder   
 anxiety  Shingles Past Surgical History:  
Procedure Laterality Date  BREAST SURGERY PROCEDURE UNLISTED    
 cyst removal  
 HX APPENDECTOMY  HX OTHER SURGICAL  8/18/15 Medtronic dual chamber PPM  
 
 
Social History: 
Social History Socioeconomic History  Marital status:  Spouse name: Not on file  Number of children: Not on file  Years of education: Not on file  Highest education level: Not on file Social Needs  Financial resource strain: Not on file  Food insecurity - worry: Not on file  Food insecurity - inability: Not on file  Transportation needs - medical: Not on file  Transportation needs - non-medical: Not on file Occupational History  Not on file Tobacco Use  Smoking status: Never Smoker  Smokeless tobacco: Never Used Substance and Sexual Activity  Alcohol use: No  
  Comment: rarely  Drug use: No  
 Sexual activity: Not on file Other Topics Concern  Not on file Social History Narrative  Not on file Family History: 
Family History Problem Relation Age of Onset  Cancer Mother Prior to Admission Medications: 
Prior to Admission Medications Prescriptions Last Dose Informant Patient Reported? Taking?  
acetaminophen (TYLENOL EXTRA STRENGTH) 500 mg tablet 10/25/2018 at Unknown time Family Member Yes Yes Sig: Take 500 mg by mouth two (2) times a day. aspirin delayed-release 81 mg tablet 10/25/2018 at AM Family Member Yes Yes Sig: Take 81 mg by mouth daily. cyanocobalamin 1,000 mcg tablet 10/25/2018 at AM Family Member Yes Yes Sig: Take 500 mcg by mouth daily. donepezil (ARICEPT) 10 mg tablet 10/25/2018 at PM Family Member Yes Yes Sig: 10 mg nightly. losartan (COZAAR) 50 mg tablet 10/25/2018 at AM Family Member No Yes Sig: One pill by mouth at bedtime. pravastatin (PRAVACHOL) 40 mg tablet 10/25/2018 at PM Family Member Yes Yes Sig: Take 40 mg by mouth nightly. Facility-Administered Medications: None Allergies: 
No Known Allergies Review of Systems Review of systems not obtained due to patient factors. Objective:  
Vitals: 
Patient Vitals for the past 8 hrs: 
 BP Temp Pulse Resp SpO2 Weight 10/27/18 1215 134/53  87 24 100 %   
10/27/18 1205 112/51 96.1 °F (35.6 °C) 71 25 100 %   
10/27/18 1200 120/74  93 22 99 %   
10/27/18 1152   78     
10/27/18 1150 113/62 96.1 °F (35.6 °C) 71 16 100 %   
10/27/18 1139 135/63  72 16 100 %   
10/27/18 1137 125/49  71  98 %   
10/27/18 1132 111/62 96.1 °F (35.6 °C) 82 22 92 %   
10/27/18 1125 110/53       
10/27/18 1118 109/52       
10/27/18 1105 112/51       
10/27/18 0739   100 20 98 %   
10/27/18 0700   94     
10/27/18 0637      45.6 kg (100 lb 9.6 oz) Exam: 
Visit Vitals /53 Pulse 87 Temp 96.1 °F (35.6 °C) Resp 24 Wt 45.6 kg (100 lb 9.6 oz) SpO2 100% BMI 19.65 kg/m² General:  Alert, cooperative, no distress, appears stated age. Head:  Normocephalic, without obvious abnormality, atraumatic. Eyes:  Conjunctivae/corneas clear. PERRL, EOMs intact. Fundi benign. Nose: Nares normal. Septum midline. Throat: Lips, mucosa, and tongue normal. Teeth and gums normal.  
Neck: Supple, symmetrical, trachea midline Back:   Symmetric, no curvature. ROM normal.  
Lungs:   Clear to auscultation bilaterally. Chest wall:  No tenderness or deformity. Heart:  Regular rate and rhythm, S1, S2 normal, no murmur, click, rub or gallop. Abdomen:   Soft, non-tender. Bowel sounds normal. No masses,  No organomegaly. Extremities: Extremities normal, atraumatic, no cyanosis or edema. Skin: Skin color, texture, turgor normal. No rashes or lesions. Data Review Recent Results (from the past 24 hour(s)) HGB & HCT Collection Time: 10/26/18  7:21 PM  
Result Value Ref Range HGB 11.2 (L) 11.5 - 16.0 g/dL HCT 34.8 (L) 35.0 - 47.0 % CBC WITH AUTOMATED DIFF Collection Time: 10/27/18  1:31 AM  
Result Value Ref Range WBC 8.3 3.6 - 11.0 K/uL  
 RBC 3.09 (L) 3.80 - 5.20 M/uL  
 HGB 10.1 (L) 11.5 - 16.0 g/dL HCT 31.3 (L) 35.0 - 47.0 % .3 (H) 80.0 - 99.0 FL  
 MCH 32.7 26.0 - 34.0 PG  
 MCHC 32.3 30.0 - 36.5 g/dL  
 RDW 13.7 11.5 - 14.5 % PLATELET 485 308 - 449 K/uL MPV 11.0 8.9 - 12.9 FL  
 NRBC 0.0 0  WBC ABSOLUTE NRBC 0.00 0.00 - 0.01 K/uL NEUTROPHILS 75 32 - 75 % LYMPHOCYTES 18 12 - 49 % MONOCYTES 5 5 - 13 % EOSINOPHILS 2 0 - 7 % BASOPHILS 1 0 - 1 % IMMATURE GRANULOCYTES 0 0.0 - 0.5 % ABS. NEUTROPHILS 6.2 1.8 - 8.0 K/UL  
 ABS. LYMPHOCYTES 1.5 0.8 - 3.5 K/UL  
 ABS. MONOCYTES 0.4 0.0 - 1.0 K/UL  
 ABS. EOSINOPHILS 0.2 0.0 - 0.4 K/UL  
 ABS. BASOPHILS 0.1 0.0 - 0.1 K/UL  
 ABS. IMM. GRANS. 0.0 0.00 - 0.04 K/UL  
 DF AUTOMATED Assessment:  
 
Principal Problem: 
  GI bleed (10/26/2018) Active Problems: 
  HTN (hypertension), benign (11/23/2014) Hyperlipidemia (11/23/2014) Complete heart block ([address] (8/17/2015) Anxiety (8/17/2015) Pacemaker (10/26/2016) Dementia (10/26/2018) CKD (chronic kidney disease) stage 3, GFR 30-59 ml/min ([facility]) (10/26/2018) Hard of hearing (10/26/2018) Plan:  
IR to attempt embolization now If they are unable to control the bleeding will need to discuss surgical intervention with the patient and her daughter. Given age and co-morbidities surgical intervention in this patient has a risk of significant morbidity and mortality Daughter currently feels that they would want to proceed Transfuse PRBC as needed IR did not identify source of bleeding. Hg after procedure 11. Follow serial Hg. Transfuse as needed. No surgical intervention unless evidence of ongoing bleed.

## 2018-10-27 NOTE — PROGRESS NOTES
TRANSFER - OUT REPORT: 
 
Verbal report given to JOSE Malik on [facility]  being transferred to ICU(unit) for routine progression of care Report consisted of patients Situation, Background, Assessment and  
Recommendations(SBAR). Information from the following report(s) SBAR and Procedure Summary was reviewed with the receiving nurse. Lines:  
Peripheral IV 10/26/18 Right Antecubital (Active) Site Assessment Clean, dry, & intact 10/27/2018  1:21 PM  
Phlebitis Assessment 0 10/27/2018  4:16 AM  
Infiltration Assessment 0 10/27/2018  4:16 AM  
Dressing Status Clean, dry, & intact 10/27/2018  4:16 AM  
Dressing Type Tape;Transparent 10/27/2018  4:16 AM  
Hub Color/Line Status Pink 10/27/2018  1:21 PM  
Action Taken Open ports on tubing capped 10/27/2018  4:16 AM  
Alcohol Cap Used Yes 10/27/2018  4:16 AM  
   
Peripheral IV 10/27/18 Left; Inner Antecubital (Active) Site Assessment Clean, dry, & intact 10/27/2018  1:21 PM  
Hub Color/Line Status Pink 10/27/2018  1:21 PM  
  
 
Opportunity for questions and clarification was provided. Patient transported with: 
 Monitor Registered Nurse Tech

## 2018-10-27 NOTE — ROUTINE PROCESS
2050:  Attempted to call ED. Put on hold for 7 min. Will call back or wait for call. 2117: TRANSFER - IN REPORT: 
 
Verbal report received from Justice Clements RN(name) on [facility]  being received from ED(unit) for routine progression of care Report consisted of patients Situation, Background, Assessment and  
Recommendations(SBAR). Information from the following report(s) SBAR, Kardex, ED Summary, Intake/Output, MAR, Accordion, Recent Results and Cardiac Rhythm Paced was reviewed with the receiving nurse. Opportunity for questions and clarification was provided. Assessment completed upon patients arrival to unit and care assumed. 2212:  Patient arrived on unit. Vitals taken and patient assessment completed. Patient only oriented to self. Patient disoriented to place, time, and situation. No reports of pain. 2234:  Patient's scheduled meds given. 2336:  Vitals taken. 0130:  Patient's labs drawn and sent to lab.  
 
0416:  Patient's vitals taken. Patient's IV alarm going off because patient keeps forgetting not to bend her arm. New IV inserted in patient. 0715:  Called into room by PCT. PCT stated patient woke up confused and had pulled all cardiac leads off and had pulled out her new IV. PCT stated patient was trying to get out of bed. Alerted charge nurse, Chele Ayoub RN and put in request for sitter. 0730: Bedside and Written shift change report given to [facility] AUTHORITY MarchJOSE (oncoming nurse) by [address] [place] [address] Avenue, RN (offgoing nurse). Report included the following information SBAR, Kardex, MAR, Accordion, Recent Results and Cardiac Rhythm V Paced.

## 2018-10-27 NOTE — PROGRESS NOTES
1130-TRANSFER - IN REPORT: 
Verbal report received from [facility] AUTHORITY March, RN(name) on [address] [facility] [place]  being received from telemetry(unit) for urgent transfer Report consisted of patients Situation, Background, Assessment and Recommendations(SBAR). Information from the following report(s) SBAR, Kardex, Recent Results and Cardiac Rhythm paced was reviewed with the receiving nurse. Anticipate patient arrival to ICU 15 post syncopal event when up to bedside commode with lower GI bleed. Zoraida GARCIA 
1148-Pt received to ICU at this time; PRBC transfusion not yet started; released from lab at 1120. Transfusion components verified and matched to patient and transfusion started immediately. Zoraida GARCIA 
1218-Pt taken off unit at this time for IR procedure; possible embolization. Daughter off unit with team.  Dr. Gurpreet Strickland also at bedside and updated patient's daughter on possible need for surgical intervention if embolization unsuccessful. Zoraida GARCIA 
1325-Report received from angio lab; no active bleeding vessel found to embolize. Will inform surgeon Dr. Gurpreet Strickland. Pt to return to ICU 15 at this time. Zoraida GARCIA 
1330-Dr. Gurpreet Strickland informed of unsuccessful embolization due to lack of bleeding vessel. For now will watch for any further bleeding and notify surgeon if active bleeding occurs or patient becomes symptomatic. Will check Hgb prior to starting 2nd ordered unit of blood per MD. Zoraida GARCIA 
1335-Family updated at this time and verbalize understanding. Zoraida GARCIA 
1355-Dr. Nolen updated at this time; requested antihypertensive prn. Will allow patient to settle and for blood pressure to hopefully come down on it's own before administering any antihypertensive. MD also agrees with checking Hgb prior to administering 2nd unit of blood. Zoraida GARCIA 
1455-Dr. Nolen informed of Hgb 11.7. Will hold off on further blood transfusions and check H&H q6h. No new orders received.  Zoraida GARCIA 
 1900-Bedside and Verbal shift change report given to Brice Brower RN (oncoming nurse) by LEONARDO Gomez RN (offgoing nurse). Report included the following information SBAR, Kardex, Procedure Summary, Intake/Output, MAR, Recent Results and Cardiac Rhythm VPaced. Pt resting comfortably in bed, family remains at bedside, VSS.  Zoraida RN

## 2018-10-28 LAB
ANION GAP SERPL CALC-SCNC: 13 MMOL/L (ref 5–15)
BUN SERPL-MCNC: 19 MG/DL (ref 6–20)
BUN/CREAT SERPL: 20 (ref 12–20)
CALCIUM SERPL-MCNC: 8.7 MG/DL (ref 8.5–10.1)
CHLORIDE SERPL-SCNC: 107 MMOL/L (ref 97–108)
CO2 SERPL-SCNC: 21 MMOL/L (ref 21–32)
CREAT SERPL-MCNC: 0.95 MG/DL (ref 0.55–1.02)
ERYTHROCYTE [DISTWIDTH] IN BLOOD BY AUTOMATED COUNT: 15.7 % (ref 11.5–14.5)
GLUCOSE BLD STRIP.AUTO-MCNC: 108 MG/DL (ref 65–100)
GLUCOSE BLD STRIP.AUTO-MCNC: 156 MG/DL (ref 65–100)
GLUCOSE BLD STRIP.AUTO-MCNC: 61 MG/DL (ref 65–100)
GLUCOSE SERPL-MCNC: 60 MG/DL (ref 65–100)
HCT VFR BLD AUTO: 31.5 % (ref 35–47)
HCT VFR BLD AUTO: 32.1 % (ref 35–47)
HCT VFR BLD AUTO: 33.3 % (ref 35–47)
HGB BLD-MCNC: 10.4 G/DL (ref 11.5–16)
HGB BLD-MCNC: 10.6 G/DL (ref 11.5–16)
HGB BLD-MCNC: 11.1 G/DL (ref 11.5–16)
MAGNESIUM SERPL-MCNC: 1.9 MG/DL (ref 1.6–2.4)
MCH RBC QN AUTO: 32.4 PG (ref 26–34)
MCHC RBC AUTO-ENTMCNC: 33 G/DL (ref 30–36.5)
MCV RBC AUTO: 98.1 FL (ref 80–99)
NRBC # BLD: 0 K/UL (ref 0–0.01)
NRBC BLD-RTO: 0 PER 100 WBC
PLATELET # BLD AUTO: 294 K/UL (ref 150–400)
PMV BLD AUTO: 11.6 FL (ref 8.9–12.9)
POTASSIUM SERPL-SCNC: 4.1 MMOL/L (ref 3.5–5.1)
RBC # BLD AUTO: 3.21 M/UL (ref 3.8–5.2)
SERVICE CMNT-IMP: ABNORMAL
SODIUM SERPL-SCNC: 141 MMOL/L (ref 136–145)
WBC # BLD AUTO: 9.1 K/UL (ref 3.6–11)

## 2018-10-28 PROCEDURE — 77030032490 HC SLV COMPR SCD KNE COVD -B

## 2018-10-28 PROCEDURE — 85027 COMPLETE CBC AUTOMATED: CPT | Performed by: INTERNAL MEDICINE

## 2018-10-28 PROCEDURE — 85018 HEMOGLOBIN: CPT | Performed by: SURGERY

## 2018-10-28 PROCEDURE — 74011000250 HC RX REV CODE- 250

## 2018-10-28 PROCEDURE — 80048 BASIC METABOLIC PNL TOTAL CA: CPT | Performed by: INTERNAL MEDICINE

## 2018-10-28 PROCEDURE — 82962 GLUCOSE BLOOD TEST: CPT

## 2018-10-28 PROCEDURE — 83735 ASSAY OF MAGNESIUM: CPT | Performed by: INTERNAL MEDICINE

## 2018-10-28 PROCEDURE — 36415 COLL VENOUS BLD VENIPUNCTURE: CPT | Performed by: SURGERY

## 2018-10-28 PROCEDURE — 74011000258 HC RX REV CODE- 258: Performed by: INTERNAL MEDICINE

## 2018-10-28 PROCEDURE — 65660000000 HC RM CCU STEPDOWN

## 2018-10-28 PROCEDURE — C9113 INJ PANTOPRAZOLE SODIUM, VIA: HCPCS | Performed by: INTERNAL MEDICINE

## 2018-10-28 PROCEDURE — 74011250636 HC RX REV CODE- 250/636: Performed by: INTERNAL MEDICINE

## 2018-10-28 PROCEDURE — 74011250637 HC RX REV CODE- 250/637: Performed by: INTERNAL MEDICINE

## 2018-10-28 RX ORDER — DEXTROSE 50 % IN WATER (D50W) INTRAVENOUS SYRINGE
Status: COMPLETED
Start: 2018-10-28 | End: 2018-10-28

## 2018-10-28 RX ORDER — DEXTROSE MONOHYDRATE AND SODIUM CHLORIDE 5; .45 G/100ML; G/100ML
50 INJECTION, SOLUTION INTRAVENOUS CONTINUOUS
Status: DISCONTINUED | OUTPATIENT
Start: 2018-10-28 | End: 2018-10-30 | Stop reason: HOSPADM

## 2018-10-28 RX ORDER — DEXTROSE 50 % IN WATER (D50W) INTRAVENOUS SYRINGE
25
Status: COMPLETED | OUTPATIENT
Start: 2018-10-28 | End: 2018-10-28

## 2018-10-28 RX ADMIN — DEXTROSE MONOHYDRATE 12.5 G: 25 INJECTION, SOLUTION INTRAVENOUS at 07:53

## 2018-10-28 RX ADMIN — ACETAMINOPHEN 500 MG: 500 TABLET ORAL at 18:57

## 2018-10-28 RX ADMIN — DONEPEZIL HYDROCHLORIDE 10 MG: 5 TABLET, FILM COATED ORAL at 21:23

## 2018-10-28 RX ADMIN — DEXTROSE MONOHYDRATE AND SODIUM CHLORIDE 75 ML/HR: 5; .45 INJECTION, SOLUTION INTRAVENOUS at 07:53

## 2018-10-28 RX ADMIN — PRAVASTATIN SODIUM 40 MG: 20 TABLET ORAL at 21:23

## 2018-10-28 RX ADMIN — ACETAMINOPHEN 500 MG: 500 TABLET ORAL at 09:16

## 2018-10-28 RX ADMIN — DEXTROSE MONOHYDRATE AND SODIUM CHLORIDE 75 ML/HR: 5; .45 INJECTION, SOLUTION INTRAVENOUS at 21:18

## 2018-10-28 RX ADMIN — Medication 10 ML: at 21:23

## 2018-10-28 RX ADMIN — PANTOPRAZOLE SODIUM 40 MG: 40 INJECTION, POWDER, FOR SOLUTION INTRAVENOUS at 18:57

## 2018-10-28 RX ADMIN — DEXTROSE 50 % IN WATER (D50W) INTRAVENOUS SYRINGE 12.5 G: at 07:53

## 2018-10-28 RX ADMIN — Medication 10 ML: at 15:02

## 2018-10-28 RX ADMIN — CYANOCOBALAMIN TAB 500 MCG 500 MCG: 500 TAB at 09:16

## 2018-10-28 NOTE — PROGRESS NOTES
0700- Bedside and Verbal shift change report given to LEONARDO ACOSTA and LEONARDO Gomez RN (oncoming nurse) by Elana Denise RN (offgoing nurse). Report included the following information SBAR, Kardex, ED Summary, Procedure Summary, Intake/Output, MAR, Recent Results and Cardiac Rhythm V paced. Will assess. Aakash Aleman SN  
1206- Pulmonary Dr. Kathy Lara at bedside and updated on patient status at this time. No further orders anticipated. Will continue to monitor for s/sx of active bleed. Po Huertas at bedside and updated on patient status at this time. Plan to continue to monitor for bleeding and recheck Hg this afternoon. Tony Hernandes SN 
1400- Patient observed resting comfortably without distress at this time. Will continue to monitor. Pablo Nguyễn SN 
1500- Dr. Shaan Huertas updated of patient's status; anticipate orders to transfer to [facility]. Diet progressed to clear liquid. Family updated. [email or web address] Medeseny SN  
5195- Patient consumed 90% of clear liquid tray. AShBuilklyn Alley SN 
1800- Pt passed small, maroon BM. Pablo Nguyễn SN Shift Summary- Patient has been resting without distress throughout the day. Hg has remained stable. No s/sx of active bleed. Pablo Nguyễn SN 
1900- Bedside and Verbal shift change report given to Rene Painter RN (oncoming nurse) by LEONARDO Gomez RN (offgoing nurse). Report included the following information SBAR, Kardex, Procedure Summary, Intake/Output, MAR, Recent Results, Cardiac Rhythm Paced and Alarm Parameters . LEONARDO ACOSTA

## 2018-10-28 NOTE — PROGRESS NOTES
[facility] DAILY PROGRESS NOTE Name: Andres Sen : 1922 MRN: [id number] Date: 10/28/2018 9:08 AM  
I have reviewed the flowsheet and previous days notes. The arteriogram showed no active bleed. No further significant GI bleeding per nursing staff. Hgb 10.4 Patient denies pain. Vital Signs:   
Visit Vitals BP 98/68 Pulse 70 Temp 99 °F (37.2 °C) Resp 24 Wt 41.8 kg (92 lb 2.4 oz) SpO2 100% BMI 18.00 kg/m² O2 Device: Room air Temp (24hrs), Av.5 °F (36.4 °C), Min:96.1 °F (35.6 °C), Max:99 °F (37.2 °C) Intake/Output:  
Last shift:      10/28 07 - 10/28 190 In: -  
Out: 250 [Urine:250] Last 3 shifts: 10/26 1901 - 10/28 0700 In: 1672.5 [I.V.:1362.5] Out: 1800 [Urine:1800] Intake/Output Summary (Last 24 hours) at 10/28/2018 0299 Last data filed at 10/28/2018 1834 Gross per 24 hour Intake 688.75 ml Output 1650 ml Net -961.25 ml Physical Exam: 
General:  Sleeping comfortably, no distress Head:  Normocephalic. Nose: Nares normal.  
On room air Neck: nontender Lungs:   Clear to auscultation bilaterally. Heart:  Regular rate and rhythm Abdomen:   Soft, non-tender. Extremities: No edema. Skin: dry Neurologic: Grossly nonfocal  
 
 
DATA:  
Current Facility-Administered Medications Medication Dose Route Frequency  dextrose 5 % - 0.45% NaCl infusion  75 mL/hr IntraVENous CONTINUOUS  
 0.9% sodium chloride infusion 250 mL  250 mL IntraVENous PRN  
 hydrALAZINE (APRESOLINE) 20 mg/mL injection 10 mg  10 mg IntraVENous Q6H PRN  pantoprazole (PROTONIX) 40 mg in sodium chloride 0.9% 10 mL injection  40 mg IntraVENous Q24H  
 sodium chloride (NS) flush 5-10 mL  5-10 mL IntraVENous Q8H  
 sodium chloride (NS) flush 5-10 mL  5-10 mL IntraVENous PRN  
 0.9% sodium chloride infusion 250 mL  250 mL IntraVENous PRN  
  0.9% sodium chloride infusion 250 mL  250 mL IntraVENous PRN  
 cyanocobalamin (VITAMIN B12) tablet 500 mcg  500 mcg Oral DAILY  donepezil (ARICEPT) tablet 10 mg  10 mg Oral QHS  pravastatin (PRAVACHOL) tablet 40 mg  40 mg Oral QHS  acetaminophen (TYLENOL) tablet 500 mg  500 mg Oral BID Telemetry: paced rhythm Labs: 
Recent Labs 10/28/18 
6890 10/27/18 
2124 10/27/18 
1437 10/27/18 
0131  10/26/18 
0127 WBC 9.1  --   --  8.3  --  8.3 HGB 10.4*  10.6* 11.3* 11.7 10.1*   < > 13.8 HCT 31.5*  32.1* 34.6*  --  31.3*   < > 42.2   --   --  265  --  274  
 < > = values in this interval not displayed. Recent Labs 10/28/18 
0415 10/26/18 
0127  139  
K 4.1 4.6  103 CO2 21 28 GLU 60* 103* BUN 19 24* CREA 0.95 1.19* CA 8.7 9.4 MG 1.9  -- No results for input(s): PH, PCO2, PO2, HCO3, FIO2 in the last 72 hours. IMPRESSION:  
· Lower GI bleed, diverticular. No bleeding site identified during angio. · Dementia PLAN:  
· Continue to monitor for signs of active bleeding · transfuse prn · Will follow as needed · My assessment/plan was discussed with: nurse Triston Villarreal MD

## 2018-10-28 NOTE — PROGRESS NOTES
[facility] [address] 
[address] [place] [address] 
[phone] Medical Progress Note NAME: Lisbeth Abel :  1922 MRM:  [id number] Date/Time: 10/28/2018 Assessment / Plan:  
 
  GI bleed: no further bleeding since yesterday morning, when pt underwent emergent arteriogram, found to have no active extravasation so did not embolize. Appreciate IR and gen surgery's prompt response and assistance. Continue to monitor closely. Follow serial Hg Acute blood loss anemia: mild/moderate. Responded nicely to pRBC yesterday. Follow closely Hypoglycemia: mild, likely from no PO intake. Start D5. If continues to have no evidence of bleeding by later today, will consider starting on clears HTN (hypertension), benign: BP low/normal. Hold losartan Hyperlipidemia: on statin Complete heart block [facility]): has pacemaker Dementia / Hard of hearing: on Aricept. Has had periods of delirium/agitation. Calm this morning, resting comfortably. Would avoid benzos, opioids, anticholinergics. Verbally re-direct whenever possible. Avoid chemical restraints unless pt is a danger to self or others. Anxiety: as above, avoid benzos CKD (chronic kidney disease) stage 3, GFR 30-59 ml/min ([address]): Cr stable. Follow BMP Total time spent: 25 minutes, d/w family at bedside Time spent in the care of this patient including reviewing records, discussing with nursing and/or other providers on the treatment team, obtaining history and examining the patient, and discussing treatment plans. Care Plan discussed with: Patient, Nursing Staff and >50% of time spent in counseling and coordination of care Discussed:  Care Plan Prophylaxis:  SCD's Disposition:  Home w/Family Subjective: Chief Complaint:  Follow up GI bleed Chart/notes/labs/studies reviewed, patient examined at bedside. Resting comfortably this morning. No complaints. No further bleeding. No f/c 
 
  
 
  
Objective:  
 
 
Vitals:  
 
  
Last 24hrs VS reviewed since prior progress note. Most recent are: 
 
Visit Vitals /47 Pulse 71 Temp 97.6 °F (36.4 °C) Resp 12 Wt 41.8 kg (92 lb 2.4 oz) SpO2 99% BMI 18.00 kg/m² SpO2 Readings from Last 6 Encounters:  
10/28/18 99% 10/16/18 95% 06/10/18 97% 11/15/17 99% 10/26/16 (!) 9%  
06/29/16 98% Intake/Output Summary (Last 24 hours) at 10/28/2018 1158 Last data filed at 10/28/2018 8248 Gross per 24 hour Intake 688.75 ml Output 1650 ml Net -961.25 ml Exam:  
 
Physical Exam: 
 
Gen:  Elderly, frail. Chronically ill-appearing. NAD HEENT:  Sclerae nonicteric, hearing intact to voice, mucous membranes moist 
Neck:  Supple, without masses. Resp:  No accessory muscle use, CTAB without wheezes, rales, or rhonchi 
Card: RRR, without m/r/g. No LE edema. Abd:  +bowel sounds, soft, NTTP, nondistended. No HSM. Neuro: Face symmetric, follows commands appropriately. No focal weakness noted Psych:  Sleeping, awakes easily by voice/touch stimuli. Oriented to self. Poor insight Medications Reviewed: (see below) Lab Data Reviewed: (see below) 
 
______________________________________________________________________ Medications:  
 
Current Facility-Administered Medications Medication Dose Route Frequency  dextrose 5 % - 0.45% NaCl infusion  75 mL/hr IntraVENous CONTINUOUS  
 0.9% sodium chloride infusion 250 mL  250 mL IntraVENous PRN  
 hydrALAZINE (APRESOLINE) 20 mg/mL injection 10 mg  10 mg IntraVENous Q6H PRN  pantoprazole (PROTONIX) 40 mg in sodium chloride 0.9% 10 mL injection  40 mg IntraVENous Q24H  
 sodium chloride (NS) flush 5-10 mL  5-10 mL IntraVENous Q8H  
 sodium chloride (NS) flush 5-10 mL  5-10 mL IntraVENous PRN  
 0.9% sodium chloride infusion 250 mL  250 mL IntraVENous PRN  
  0.9% sodium chloride infusion 250 mL  250 mL IntraVENous PRN  
 cyanocobalamin (VITAMIN B12) tablet 500 mcg  500 mcg Oral DAILY  donepezil (ARICEPT) tablet 10 mg  10 mg Oral QHS  pravastatin (PRAVACHOL) tablet 40 mg  40 mg Oral QHS  acetaminophen (TYLENOL) tablet 500 mg  500 mg Oral BID Lab Review:  
 
Recent Labs 10/28/18 
6998 10/27/18 
2124 10/27/18 
1437 10/27/18 
0131  10/26/18 
0127 WBC 9.1  --   --  8.3  --  8.3 HGB 10.4*  10.6* 11.3* 11.7 10.1*   < > 13.8 HCT 31.5*  32.1* 34.6*  --  31.3*   < > 42.2   --   --  265  --  274  
 < > = values in this interval not displayed. Recent Labs 10/28/18 
0415 10/26/18 
0127  139  
K 4.1 4.6  103 CO2 21 28 GLU 60* 103* BUN 19 24* CREA 0.95 1.19* CA 8.7 9.4 MG 1.9  -- No components found for: [place] No results for input(s): PH, PCO2, PO2, HCO3, FIO2 in the last 72 hours. No results for input(s): INR in the last 72 hours. No lab exists for component: INREXT No results found for: SDES Lab Results Component Value Date/Time Culture result: NO GROWTH 2 DAYS 11/15/2017 10:51 AM  
 Culture result: NO GROWTH 6 DAYS 11/25/2014 03:13 PM  
 Culture result: ESCHERICHIA COLI 11/25/2014 12:51 PM  
 Culture result: PSEUDOMONAS AERUGINOSA 11/25/2014 12:51 PM  
 Culture result: MIXED SKIN FUENTES ISOLATED 11/25/2014 12:51 PM  
 
        
___________________________________________________ Attending Physician: Angel Hilton MD

## 2018-10-28 NOTE — PROGRESS NOTES
SURGERY PROGRESS NOTE Admit Date: 10/26/2018 POD * No surgery found * Procedure: * No surgery found * Subjective:  
Resting comfortably No further episodes of bleeding Hg stable Objective:  
 
Visit Vitals /53 Pulse 91 Temp 97.6 °F (36.4 °C) Resp 19 Wt 41.8 kg (92 lb 2.4 oz) SpO2 99% BMI 18.00 kg/m² Temp (24hrs), Av.5 °F (36.4 °C), Min:96.1 °F (35.6 °C), Max:99 °F (37.2 °C) Physical Exam: Abdomen:  Soft. Non-tender, non-distended. Lab Results Component Value Date/Time WBC 9.1 10/28/2018 04:15 AM  
 HGB 10.6 (L) 10/28/2018 04:15 AM  
 HGB 10.4 (L) 10/28/2018 04:15 AM  
 Hemoglobin (POC) 14.6 2016 11:35 AM  
 HCT 32.1 (L) 10/28/2018 04:15 AM  
 HCT 31.5 (L) 10/28/2018 04:15 AM  
 Hematocrit (POC) 41 06/10/2018 04:10 PM  
 PLATELET 982  04:15 AM  
 MCV 98.1 10/28/2018 04:15 AM  
 
 
Assessment:  
 
Principal Problem: 
  GI bleed (10/26/2018) Active Problems: 
  HTN (hypertension), benign (2014) Hyperlipidemia (2014) Complete heart block ([address] (2015) Anxiety (2015) Pacemaker (10/26/2016) Dementia (10/26/2018) CKD (chronic kidney disease) stage 3, GFR 30-59 ml/min ([facility]) (10/26/2018) Hard of hearing (10/26/2018) Plan/Recommendations/Medical Decision Making:  
Continue serial Hg checks No need for surgical intervention at this time

## 2018-10-28 NOTE — PROGRESS NOTES
Problem: Pressure Injury - Risk of 
Goal: *Prevention of pressure injury Document Mathew Scale and appropriate interventions in the flowsheet. Outcome: Progressing Towards Goal 
Pressure Injury Interventions: 
Sensory Interventions: Assess changes in LOC, Check visual cues for pain, Keep linens dry and wrinkle-free Moisture Interventions: Absorbent underpads Activity Interventions: Pressure redistribution bed/mattress(bed type) Mobility Interventions: HOB 30 degrees or less, Pressure redistribution bed/mattress (bed type) Nutrition Interventions: Document food/fluid/supplement intake Friction and Shear Interventions: HOB 30 degrees or less, Lift sheet, Minimize layers Problem: Falls - Risk of 
Goal: *Absence of Falls Document Katherin Vidal Fall Risk and appropriate interventions in the flowsheet. Outcome: Progressing Towards Goal 
Fall Risk Interventions: 
Mobility Interventions: Bed/chair exit alarm, Communicate number of staff needed for ambulation/transfer Mentation Interventions: Bed/chair exit alarm, Door open when patient unattended, Evaluate medications/consider consulting pharmacy Medication Interventions: Bed/chair exit alarm, Evaluate medications/consider consulting pharmacy Elimination Interventions: Bed/chair exit alarm, Call light in reach, Patient to call for help with toileting needs History of Falls Interventions: Bed/chair exit alarm, Door open when patient unattended, Evaluate medications/consider consulting pharmacy, Room close to nurse's station

## 2018-10-28 NOTE — PROGRESS NOTES
Gastrointestinal Progress Note 
 
10/28/2018 Admit Date: 10/26/2018 Subjective:  comfortable Events of yesterday noted; no blood loss identified after IR evaluation Current Facility-Administered Medications Medication Dose Route Frequency  dextrose 5 % - 0.45% NaCl infusion  75 mL/hr IntraVENous CONTINUOUS  
 0.9% sodium chloride infusion 250 mL  250 mL IntraVENous PRN  
 hydrALAZINE (APRESOLINE) 20 mg/mL injection 10 mg  10 mg IntraVENous Q6H PRN  pantoprazole (PROTONIX) 40 mg in sodium chloride 0.9% 10 mL injection  40 mg IntraVENous Q24H  
 sodium chloride (NS) flush 5-10 mL  5-10 mL IntraVENous Q8H  
 sodium chloride (NS) flush 5-10 mL  5-10 mL IntraVENous PRN  
 0.9% sodium chloride infusion 250 mL  250 mL IntraVENous PRN  
 0.9% sodium chloride infusion 250 mL  250 mL IntraVENous PRN  
 cyanocobalamin (VITAMIN B12) tablet 500 mcg  500 mcg Oral DAILY  donepezil (ARICEPT) tablet 10 mg  10 mg Oral QHS  pravastatin (PRAVACHOL) tablet 40 mg  40 mg Oral QHS  acetaminophen (TYLENOL) tablet 500 mg  500 mg Oral BID Objective:  
 
Blood pressure 118/46, pulse 81, temperature 97.6 °F (36.4 °C), resp. rate 17, weight 41.8 kg (92 lb 2.4 oz), SpO2 99 %. 10/28 0701 - 10/28 1900 In: -  
Out: 250 [Urine:250] 10/26 1901 - 10/28 0700 In: 1672.5 [I.V.:1362.5] Out: 1800 [Urine:1800] EXAM:  GENERAL: no distress, HEART: regular rate and rhythm, systolic murmur: early systolic 2/6, harsh at 2nd left intercostal space, at 2nd right intercostal space, LUNGS: chest clear, no wheezing, rales, normal symmetric air entry, ABDOMEN:  Bowel sounds are normal, liver is not enlarged, spleen is not enlarged Data Review Recent Results (from the past 24 hour(s)) HEMOGLOBIN Collection Time: 10/27/18  2:37 PM  
Result Value Ref Range HGB 11.7 11.5 - 16.0 g/dL HGB & HCT Collection Time: 10/27/18  9:24 PM  
Result Value Ref Range HGB 11.3 (L) 11.5 - 16.0 g/dL HCT 34.6 (L) 35.0 - 47.0 % HGB & HCT Collection Time: 10/28/18  4:15 AM  
Result Value Ref Range HGB 10.6 (L) 11.5 - 16.0 g/dL HCT 32.1 (L) 35.0 - 47.0 % CBC W/O DIFF Collection Time: 10/28/18  4:15 AM  
Result Value Ref Range WBC 9.1 3.6 - 11.0 K/uL  
 RBC 3.21 (L) 3.80 - 5.20 M/uL  
 HGB 10.4 (L) 11.5 - 16.0 g/dL HCT 31.5 (L) 35.0 - 47.0 % MCV 98.1 80.0 - 99.0 FL  
 MCH 32.4 26.0 - 34.0 PG  
 MCHC 33.0 30.0 - 36.5 g/dL  
 RDW 15.7 (H) 11.5 - 14.5 % PLATELET 380 727 - 038 K/uL MPV 11.6 8.9 - 12.9 FL  
 NRBC 0.0 0  WBC ABSOLUTE NRBC 0.00 0.00 - 0.01 K/uL MAGNESIUM Collection Time: 10/28/18  4:15 AM  
Result Value Ref Range Magnesium 1.9 1.6 - 2.4 mg/dL METABOLIC PANEL, BASIC Collection Time: 10/28/18  4:15 AM  
Result Value Ref Range Sodium 141 136 - 145 mmol/L Potassium 4.1 3.5 - 5.1 mmol/L Chloride 107 97 - 108 mmol/L  
 CO2 21 21 - 32 mmol/L Anion gap 13 5 - 15 mmol/L Glucose 60 (L) 65 - 100 mg/dL BUN 19 6 - 20 MG/DL Creatinine 0.95 0.55 - 1.02 MG/DL  
 BUN/Creatinine ratio 20 12 - 20 GFR est AA >60 >60 ml/min/1.73m2 GFR est non-AA 55 (L) >60 ml/min/1.73m2 Calcium 8.7 8.5 - 10.1 MG/DL  
GLUCOSE, POC Collection Time: 10/28/18  7:44 AM  
Result Value Ref Range Glucose (POC) 61 (L) 65 - 100 mg/dL Performed by Perlie Closs GLUCOSE, POC Collection Time: 10/28/18  8:05 AM  
Result Value Ref Range Glucose (POC) 156 (H) 65 - 100 mg/dL Performed by Perlie Closs GLUCOSE, POC Collection Time: 10/28/18 11:47 AM  
Result Value Ref Range Glucose (POC) 108 (H) 65 - 100 mg/dL Performed by Perlie Closs Assessment:  
 
Principal Problem: 
  GI bleed (10/26/2018) Active Problems: 
  HTN (hypertension), benign (11/23/2014) Hyperlipidemia (11/23/2014) Complete heart block ([address] (8/17/2015) Anxiety (8/17/2015) Pacemaker (10/26/2016) Dementia (10/26/2018) CKD (chronic kidney disease) stage 3, GFR 30-59 ml/min (HCC) (10/26/2018) Hard of hearing (10/26/2018) Plan: 1. No new reccommendations

## 2018-10-29 LAB
ABO + RH BLD: NORMAL
ANION GAP SERPL CALC-SCNC: 9 MMOL/L (ref 5–15)
BLD PROD TYP BPU: NORMAL
BLD PROD TYP BPU: NORMAL
BLOOD GROUP ANTIBODIES SERPL: NORMAL
BPU ID: NORMAL
BPU ID: NORMAL
BUN SERPL-MCNC: 13 MG/DL (ref 6–20)
BUN/CREAT SERPL: 15 (ref 12–20)
CALCIUM SERPL-MCNC: 8.6 MG/DL (ref 8.5–10.1)
CHLORIDE SERPL-SCNC: 107 MMOL/L (ref 97–108)
CO2 SERPL-SCNC: 24 MMOL/L (ref 21–32)
CREAT SERPL-MCNC: 0.87 MG/DL (ref 0.55–1.02)
CROSSMATCH RESULT,%XM: NORMAL
CROSSMATCH RESULT,%XM: NORMAL
ERYTHROCYTE [DISTWIDTH] IN BLOOD BY AUTOMATED COUNT: 15.3 % (ref 11.5–14.5)
GLUCOSE SERPL-MCNC: 119 MG/DL (ref 65–100)
HCT VFR BLD AUTO: 35.4 % (ref 35–47)
HGB BLD-MCNC: 11.6 G/DL (ref 11.5–16)
MCH RBC QN AUTO: 31.7 PG (ref 26–34)
MCHC RBC AUTO-ENTMCNC: 32.8 G/DL (ref 30–36.5)
MCV RBC AUTO: 96.7 FL (ref 80–99)
NRBC # BLD: 0 K/UL (ref 0–0.01)
NRBC BLD-RTO: 0 PER 100 WBC
PLATELET # BLD AUTO: 291 K/UL (ref 150–400)
PMV BLD AUTO: 11.2 FL (ref 8.9–12.9)
POTASSIUM SERPL-SCNC: 3.7 MMOL/L (ref 3.5–5.1)
RBC # BLD AUTO: 3.66 M/UL (ref 3.8–5.2)
SODIUM SERPL-SCNC: 140 MMOL/L (ref 136–145)
SPECIMEN EXP DATE BLD: NORMAL
STATUS OF UNIT,%ST: NORMAL
STATUS OF UNIT,%ST: NORMAL
UNIT DIVISION, %UDIV: 0
UNIT DIVISION, %UDIV: 0
WBC # BLD AUTO: 9 K/UL (ref 3.6–11)

## 2018-10-29 PROCEDURE — C9113 INJ PANTOPRAZOLE SODIUM, VIA: HCPCS | Performed by: INTERNAL MEDICINE

## 2018-10-29 PROCEDURE — 74011250637 HC RX REV CODE- 250/637: Performed by: INTERNAL MEDICINE

## 2018-10-29 PROCEDURE — 85027 COMPLETE CBC AUTOMATED: CPT | Performed by: INTERNAL MEDICINE

## 2018-10-29 PROCEDURE — 97535 SELF CARE MNGMENT TRAINING: CPT

## 2018-10-29 PROCEDURE — 74011250636 HC RX REV CODE- 250/636: Performed by: INTERNAL MEDICINE

## 2018-10-29 PROCEDURE — 65660000000 HC RM CCU STEPDOWN

## 2018-10-29 PROCEDURE — 97162 PT EVAL MOD COMPLEX 30 MIN: CPT

## 2018-10-29 PROCEDURE — 97116 GAIT TRAINING THERAPY: CPT

## 2018-10-29 PROCEDURE — 80048 BASIC METABOLIC PNL TOTAL CA: CPT | Performed by: INTERNAL MEDICINE

## 2018-10-29 PROCEDURE — 36415 COLL VENOUS BLD VENIPUNCTURE: CPT | Performed by: INTERNAL MEDICINE

## 2018-10-29 PROCEDURE — 97165 OT EVAL LOW COMPLEX 30 MIN: CPT

## 2018-10-29 PROCEDURE — 77030038269 HC DRN EXT URIN PURWCK BARD -A

## 2018-10-29 PROCEDURE — 74011000258 HC RX REV CODE- 258: Performed by: INTERNAL MEDICINE

## 2018-10-29 RX ADMIN — ACETAMINOPHEN 500 MG: 500 TABLET ORAL at 18:33

## 2018-10-29 RX ADMIN — DONEPEZIL HYDROCHLORIDE 10 MG: 5 TABLET, FILM COATED ORAL at 21:51

## 2018-10-29 RX ADMIN — ACETAMINOPHEN 500 MG: 500 TABLET ORAL at 11:35

## 2018-10-29 RX ADMIN — Medication 10 ML: at 06:05

## 2018-10-29 RX ADMIN — PANTOPRAZOLE SODIUM 40 MG: 40 INJECTION, POWDER, FOR SOLUTION INTRAVENOUS at 18:33

## 2018-10-29 RX ADMIN — CYANOCOBALAMIN TAB 500 MCG 500 MCG: 500 TAB at 11:35

## 2018-10-29 RX ADMIN — DEXTROSE MONOHYDRATE AND SODIUM CHLORIDE 75 ML/HR: 5; .45 INJECTION, SOLUTION INTRAVENOUS at 11:31

## 2018-10-29 RX ADMIN — PRAVASTATIN SODIUM 40 MG: 20 TABLET ORAL at 21:51

## 2018-10-29 RX ADMIN — Medication 10 ML: at 16:27

## 2018-10-29 RX ADMIN — Medication 10 ML: at 21:59

## 2018-10-29 NOTE — PROGRESS NOTES
Nutrition Assessment: 
 
RECOMMENDATIONS/INTERVENTION(S):  
1. As medically able, advance diet to low fiber. 2. Encourage PO intake. 3. Ensure Clear while on clear liquid diet BID. When diet advances, modify to Ensure Enlive strawberry. 4. Monitor intake, wt, diet advancement/tolerance and GI status. ASSESSMENT:  
10/29: Patient seen for ICU lengthy of stay x 3 days and low BMI. Patient admitted for a GI bleed with PMHx of diverticulitis, HTN, and dementia. Patient currently on clear liquid diet, tolerating it well and had 90% of her tray last night. D5 providing 306kcal/day. BG controlled, K and mag WNL. No episodes of bleeding in the past 24 hours. Patient underweight, particularly for advanced age. Per family, patient has oatmeal, waffles or muffins for breakfast; Ensure and a banana for lunch, and dinner is whatever the family has. If patient does not like the evening meal or if they ran out of bananas for lunch the patient has a peanut butter and jelly sandwich. Patient prefers strawberry Ensure. Family reports patient generally eating well PTA and reports a slow weight loss of 10 lbs over the last 3 years. SUBJECTIVE/OBJECTIVE:  
Diet Order: Clear liquids 
% Eaten:  No data found. Pertinent Medications: [x] Reviewed-d5, PPI, B12 Past Medical History:  
Diagnosis Date  Diverticulitis  Hyperlipidemia  Hypertension  Ill-defined condition   
 pacemaker  Pacemaker  Psychiatric disorder   
 anxiety  Shingles Chemistries: 
Lab Results Component Value Date/Time  Sodium 140 10/29/2018 04:51 AM  
 Potassium 3.7 10/29/2018 04:51 AM  
 Chloride 107 10/29/2018 04:51 AM  
 CO2 24 10/29/2018 04:51 AM  
 Anion gap 9 10/29/2018 04:51 AM  
 Glucose 119 (H) 10/29/2018 04:51 AM  
 BUN 13 10/29/2018 04:51 AM  
 Creatinine 0.87 10/29/2018 04:51 AM  
 BUN/Creatinine ratio 15 10/29/2018 04:51 AM  
 GFR est AA >60 10/29/2018 04:51 AM  
 GFR est non-AA >60 10/29/2018 04:51 AM  
 Calcium 8.6 10/29/2018 04:51 AM  
 AST (SGOT) 15 11/15/2017 10:36 AM  
 Alk. phosphatase 62 11/15/2017 10:36 AM  
 Protein, total 7.0 11/15/2017 10:36 AM  
 Albumin 3.7 11/15/2017 10:36 AM  
 Globulin 3.3 11/15/2017 10:36 AM  
 A-G Ratio 1.1 11/15/2017 10:36 AM  
 ALT (SGPT) 16 11/15/2017 10:36 AM  
  
Anthropometrics: Height: 5' (152.4 cm) Weight: 41.8 kg (92 lb 2.4 oz) IBW (%IBW):   ( ) UBW (%UBW):   (  %) BMI: Body mass index is 18 kg/m². This BMI is indicative of: 
 [x] Underweight    [] Normal    [] Overweight    []  Obesity    []  Extreme Obesity (BMI>40) Estimated Nutrition Needs (Based on): 8609 Kcals/day( x 1.3 AF +250kcal) , 46 g(-54 (1.1-1.3g/kg actual bw)) Protein Carbohydrate: At Least 130 g/day  Fluids: 200 mL/day (1 ml/kcal) Last BM: 10/28, melena  [x]Active     []Hyperactive  []Hypoactive       [] Absent   BS Skin:    [x] Intact   [] Incision  [] Breakdown   [] DTI   [] Tears/Excoriation/Abrasion  []Edema [] Other: Wt Readings from Last 30 Encounters:  
10/29/18 41.8 kg (92 lb 2.4 oz) 10/16/18 45.4 kg (100 lb)  
06/18/18 44 kg (97 lb)  
06/10/18 44.5 kg (98 lb)  
11/15/17 42.6 kg (94 lb) 11/08/17 43.5 kg (96 lb) 10/26/16 45.7 kg (100 lb 12.8 oz) 06/29/16 45.4 kg (100 lb) 10/19/15 48.1 kg (106 lb)  
08/27/15 46.1 kg (101 lb 9.6 oz) 08/20/15 46.4 kg (102 lb 3.2 oz) 01/30/15 44 kg (97 lb)  
11/25/14 45.4 kg (100 lb)  
11/23/14 45.4 kg (100 lb 0 oz) NUTRITION DIAGNOSES:  
Problem:  Underweight Etiology: related to inadequate energy intake Signs/Symptoms: as evidenced by BMI of 18kg/m^2 NUTRITION INTERVENTIONS: 
Meals/Snacks: General/healthful diet   Supplements: Commercial supplement GOAL:  
diet to advance past clear liquids and patient to consume 50% of meals and ONS in the next 2-4 days Cultural, Anabaptist, or Ethnic Dietary Needs: None EDUCATION & DISCHARGE NEEDS:  
 [x] None Identified [] Identified and Education Provided/Documented 
 [] Identified and Pt declined/was not appropriate [x] Interdisciplinary Care Plan Reviewed/Documented  
 [x] Discharge Needs: TBD [] No Nutrition Related Discharge Needs NUTRITION RISK:  
Pt Is At Nutrition Risk  [x] No Nutrition Risk Identified  [] PT SEEN FOR:  
 []  MD Consult: []Calorie Count []Diabetic Diet Education []Diet Education []Electrolyte Management []General Nutrition Management and Supplements []Management of Tube Feeding []TPN Recommendations []  RN Referral:  []MST score >=2 
   []Enteral/Parenteral Nutrition PTA []Pregnant: Gestational DM or Multigestation  
              [] Pressure Ulcer 
 
[x]  Low BMI      [x]  Length of Stay-ICU x 3 days       [] Dysphagia Diet         [] Ventilator 
[]  Follow-up Previous Recommendations: 
 [] Implemented          [] Not Implemented          [x] Not Applicable Previous Goal: 
 [] Met              [] Progressing Towards Goal              [] Not Progressing Towards Goal   [x] Not Applicable Diane Aragon RD Pager [phone] Office [phone]

## 2018-10-29 NOTE — PROGRESS NOTES
Problem: Self Care Deficits Care Plan (Adult) Goal: *Acute Goals and Plan of Care (Insert Text) Occupational Therapy Goals Initiated 10/29/2018 1. Patient will perform grooming with independence within 7 day(s). 2.  Patient will perform upper body dressing and bathing with independence within 7 day(s). 3.  Patient will perform lower body dressing and bathing with supervision/set-up within 7 day(s). 4.  Patient will perform toilet transfers with modified independence within 7 day(s). 5.  Patient will perform all aspects of toileting with modified independence within 7 day(s). 6.  Patient will participate in upper extremity therapeutic exercise/activities with independence for 10 minutes within 7 day(s). 7.  Patient will utilize energy conservation techniques during functional activities with verbal cues within 7 day(s). Occupational Therapy EVALUATION Patient: Corazon Anderson [de-identified]80 y.o. female) Date: 10/29/2018 Primary Diagnosis: GI bleed Precautions:  WBAT, Fall ASSESSMENT : 
Based on the objective data described below, the patient presents with decreased activity tolerance following admission on 10/26 for GI bleed. PTA, patient lives with her daughter and son-in-law, is mostly independent with ADLs, and ambulates with a SPC, but daughter recently purchased her a RW for patient to start using. Daughter confirms patient has a history of falls, mostly at night (in the bathroom), most recently 1.5 weeks ago hitting her head. She went to the ER which showed no acute injury. In addition, patient has dementia at baseline. Today, patient required supervision for bed mobility and supervision to CGA for OOB transfers using rolling walker. She was able to transfer to the commode for toileting when OOB, minimal blood noted in stool; RN aware and visualized.   Patient currently requires up to supervision/setup for UB ADLs and min A for LB ADLs. Education provided regarding adaptive ADL techniques, safe transfer techniques, and pacing techniques. Patient would benefit from continued skilled OT to progress towards goals and improve overall independence. Patient will benefit from skilled intervention to address the above impairments. Patients rehabilitation potential is considered to be Good Factors which may influence rehabilitation potential include:  
[x]             None noted []             Mental ability/status []             Medical condition []             Home/family situation and support systems []             Safety awareness []             Pain tolerance/management 
[]             Other: PLAN : 
Recommendations and Planned Interventions: 
[x]               Self Care Training                  [x]        Therapeutic Activities [x]               Functional Mobility Training    []        Cognitive Retraining 
[x]               Therapeutic Exercises           [x]        Endurance Activities []               Balance Training                   []        Neuromuscular Re-Education []               Visual/Perceptual Training     [x]   Home Safety Training 
[x]               Patient Education                 [x]        Family Training/Education []               Other (comment): Frequency/Duration: Patient will be followed by occupational therapy 3 times a week to address goals. Discharge Recommendations: Home Health Further Equipment Recommendations for Discharge: none at this time SUBJECTIVE:  
Patient stated I better go to the bathroom.  OBJECTIVE DATA SUMMARY:  
HISTORY:  
Past Medical History:  
Diagnosis Date  Diverticulitis  Hyperlipidemia  Hypertension  Ill-defined condition   
 pacemaker  Pacemaker  Psychiatric disorder   
 anxiety  Shingles Past Surgical History:  
Procedure Laterality Date  BREAST SURGERY PROCEDURE UNLISTED    
 cyst removal  
 HX APPENDECTOMY  HX OTHER SURGICAL  8/18/15 Medtronic dual chamber PPM  
 
 
Prior Level of Function/Environment/Context: Patient lives with her daughter and son-in-law. See assessment section for PLOF information reported by pt. Home Situation Home Environment: Private residence Wheelchair Ramp: No 
One/Two Story Residence: One story Living Alone: No 
Support Systems: Family member(s)(lives with her daughter and fami;y) Patient Expects to be Discharged to[de-identified] Private residence Current DME Used/Available at Home: Cane, straight, Walker, rolling Tub or Shower Type: Shower(pt prefers to complete sponge baths only) Hand dominance: RightEXAMINATION OF PERFORMANCE DEFICITS: 
Cognitive/Behavioral Status: 
Neurologic State: Alert Orientation Level: Oriented to person Cognition: Follows commands Perception: Appears intact Perseveration: No perseveration noted Safety/Judgement: Awareness of environment Skin: Intact in the uppers Edema: none noted in the uppers Hearing: Auditory Auditory Impairment: Hard of hearing, right side Vision/Perceptual:   
Tracking: Able to track stimulus in all quadrants w/o difficulty Diplopia: No   
Acuity: Within Defined Limits Range of Motion: 
AROM: Within functional limits in the uppers PROM: Within functional limits in the uppers Strength: 
Strength: Generally decreased, functional in the uppers Coordination: 
Fine Motor Skills-Upper: Left Intact; Right Intact Gross Motor Skills-Upper: Left Intact; Right Intact Tone & Sensation: 
Tone: Normal 
Sensation: Intact Balance: 
Sitting: Intact Standing: Intact Functional Mobility and Transfers for ADLs:Bed Mobility: 
Rolling: Supervision Supine to Sit: Supervision Sit to Supine: (pt remained up at end of tx) Scooting: Supervision Transfers: 
Sit to Stand: Supervision Stand to Sit: Supervision Bed to Chair: Supervision Toilet Transfer : Contact guard assistance(low commode) ADL Assessment: Feeding: Independent Oral Facial Hygiene/Grooming: Supervision;Setup Bathing: Minimum assistance Upper Body Dressing: Supervision;Setup Lower Body Dressing: Minimum assistance Toileting: Minimum assistance Cognitive Retraining Safety/Judgement: Awareness of environment Functional Measure: 
Barthel Index: 
 
Bathin Bladder: 10 Bowels: 10 
Groomin Dressin Feeding: 10 Mobility: 5 Stairs: 5 Toilet Use: 5 Transfer (Bed to Chair and Back): 10 Total: 60 Barthel and G-code impairment scale: 
Percentage of impairment CH 
0% CI 
1-19% CJ 
20-39% CK 
40-59% CL 
60-79% CM 
80-99% CN 
100% Barthel Score 0-100 100 99-80 79-60 59-40 20-39 1-19 
 0 Barthel Score 0-20 20 17-19 13-16 9-12 5-8 1-4 0 The Barthel ADL Index: Guidelines 1. The index should be used as a record of what a patient does, not as a record of what a patient could do. 2. The main aim is to establish degree of independence from any help, physical or verbal, however minor and for whatever reason. 3. The need for supervision renders the patient not independent. 4. A patient's performance should be established using the best available evidence. Asking the patient, friends/relatives and nurses are the usual sources, but direct observation and common sense are also important. However direct testing is not needed. 5. Usually the patient's performance over the preceding 24-48 hours is important, but occasionally longer periods will be relevant. 6. Middle categories imply that the patient supplies over 50 per cent of the effort. 7. Use of aids to be independent is allowed. Jessenia Bautista., Barthel, D.W. (0642). Functional evaluation: the Barthel Index. [address] [facility] (14)2. JOSEPH Craft, Cele Sim., Mukesh Pugh., [place], [address] ().  Measuring the change indisability after inpatient rehabilitation; comparison of the responsiveness of the Barthel Index and Functional [facility] Measure. Journal of Neurology, Neurosurgery, and Psychiatry, 66(4), 805-305. NELLY Arriaza, GUTIERREZ Villalobos, & Sejal Cruz M.A. (2004.) Assessment of post-stroke quality of life in cost-effectiveness studies: The usefulness of the Barthel Index and the EuroQoL-5D. [facility], 13, 547-82 G codes: In compliance with CMSs Claims Based Outcome Reporting, the following G-code set was chosen for this patient based on their primary functional limitation being treated: The outcome measure chosen to determine the severity of the functional limitation was the Barthel Index with a score of 60/100 which was correlated with the impairment scale. ? Self Care:  
  - CURRENT STATUS: CJ - 20%-39% impaired, limited or restricted  - GOAL STATUS: CI - 1%-19% impaired, limited or restricted  - D/C STATUS:  ---------------To be determined--------------- Occupational Therapy Evaluation Charge Determination History Examination Decision-Making LOW Complexity : Brief history review  LOW Complexity : 1-3 performance deficits relating to physical, cognitive , or psychosocial skils that result in activity limitations and / or participation restrictions  LOW Complexity : No comorbidities that affect functional and no verbal or physical assistance needed to complete eval tasks Based on the above components, the patient evaluation is determined to be of the following complexity level: LOW Pain: 
Pain Scale 1: Numeric (0 - 10) Pain Intensity 1: 0 Activity Tolerance:  
Patient tolerated eval well. Please refer to the flowsheet for vital signs taken during this treatment. After treatment:  
[x] Patient left in no apparent distress sitting up in chair 
[] Patient left in no apparent distress in bed 
[x] Call bell left within reach [x] Nursing notified 
[x] Caregiver present 
[] Bed alarm activated COMMUNICATION/EDUCATION:  
 The patients plan of care was discussed with: Physical Therapist, Registered Nurse and patient. Thomas Zhong [x] Home safety education was provided and the patient/caregiver indicated understanding. [x] Patient/family have participated as able in goal setting and plan of care. [x] Patient/family agree to work toward stated goals and plan of care. [] Patient understands intent and goals of therapy, but is neutral about his/her participation. [] Patient is unable to participate in goal setting and plan of care. This patients plan of care is appropriate for delegation to [facility]. Thank you for this referral. 
Gustabo Carrion, OTR/L Time Calculation: 20 mins

## 2018-10-29 NOTE — PROGRESS NOTES
0730 - . Nathan Vargas Bedside shift change report given to Katherin, RN (oncoming nurse) by Chary Tanner, RN (offgoing nurse). Report included the following information SBAR, Procedure Summary, Intake/Output, Recent Results and Med Rec Status. [id number] Terry Brown NP at bedside to evaluate patient 1100 -  at bedside to evaluate patient [address] made aware patient had BM with some brighter red blood 1935 -. .Bedside shift change report given to [place] (oncoming nurse) by Leora Swartz, JOSE (offgoing nurse). Report included the following information SBAR, MAR and Recent Results.

## 2018-10-29 NOTE — PROGRESS NOTES
[facility] DAILY PROGRESS NOTE Name: Hannah Gutierrez : 1922 MRN: [id number] Date: 10/29/2018 9:08 AM  
I have reviewed the flowsheet and previous days notes. Overnight events:  
Hgb stable No further bleeding Denies abdominal pain or shortness of breath Vital Signs:   
Visit Vitals /67 Pulse 60 Temp 98.1 °F (36.7 °C) Resp 17 Wt 41.8 kg (92 lb 2.4 oz) SpO2 99% BMI 18.00 kg/m² O2 Device: Room air Temp (24hrs), Av.1 °F (36.7 °C), Min:97.9 °F (36.6 °C), Max:98.5 °F (36.9 °C) Intake/Output:  
Last shift:      No intake/output data recorded. Last 3 shifts: 10/27 1901 - 10/29 0700 In: 2158.8 [P.O.:500; I.V.:1658.8] Out: 0 [Urine:0] Intake/Output Summary (Last 24 hours) at 10/29/2018 1030 Last data filed at 10/29/2018 0600 Gross per 24 hour Intake 2000 ml Output 1400 ml Net 600 ml Physical Exam: 
General:  Awake, alert, in no distress Head:  Normocephalic. Nose: Nares normal.  
On room air Neck: nontender Lungs:   Clear to auscultation bilaterally. Heart:  Regular rate and rhythm Abdomen:   Soft, non-tender. Extremities: No edema. Skin: dry Neurologic: Grossly nonfocal  
 
 
DATA:  
Current Facility-Administered Medications Medication Dose Route Frequency  dextrose 5 % - 0.45% NaCl infusion  75 mL/hr IntraVENous CONTINUOUS  
 hydrALAZINE (APRESOLINE) 20 mg/mL injection 10 mg  10 mg IntraVENous Q6H PRN  pantoprazole (PROTONIX) 40 mg in sodium chloride 0.9% 10 mL injection  40 mg IntraVENous Q24H  
 sodium chloride (NS) flush 5-10 mL  5-10 mL IntraVENous Q8H  
 sodium chloride (NS) flush 5-10 mL  5-10 mL IntraVENous PRN  
 cyanocobalamin (VITAMIN B12) tablet 500 mcg  500 mcg Oral DAILY  donepezil (ARICEPT) tablet 10 mg  10 mg Oral QHS  pravastatin (PRAVACHOL) tablet 40 mg  40 mg Oral QHS  acetaminophen (TYLENOL) tablet 500 mg  500 mg Oral BID  
 
 
 Telemetry: paced rhythm Labs: 
Recent Labs 10/29/18 
0451 10/28/18 
1406 10/28/18 
0415  10/27/18 
0131 WBC 9.0  --  9.1  --  8.3 HGB 11.6 11.1* 10.4*  10.6*   < > 10.1* HCT 35.4 33.3* 31.5*  32.1*   < > 31.3*  
  --  294  --  265  
 < > = values in this interval not displayed. Recent Labs 10/29/18 
0451 10/28/18 
9771  141  
K 3.7 4.1  107 CO2 24 21 * 60* BUN 13 19 CREA 0.87 0.95  
CA 8.6 8.7 MG  --  1.9 No results for input(s): PH, PCO2, PO2, HCO3, FIO2 in the last 72 hours. IMPRESSION:  
· Lower GI bleed, diverticular. No bleeding site identified during angio. · Dementia PLAN:  
· Continue to monitor for signs of active bleeding · transfuse prn · Will follow as needed · Transfer to tele 
· PT/OT 
· OOB into chair · My assessment/plan was discussed with: nurse Trudi Hinds MD

## 2018-10-29 NOTE — PROGRESS NOTES
Problem: Mobility Impaired (Adult and Pediatric) Goal: *Acute Goals and Plan of Care (Insert Text) Physical Therapy Goals Initiated 10/29/2018 1. Patient will move from supine to sit and sit to supine  in bed with modified independence within 7 day(s). 2.  Patient will transfer from bed to chair and chair to bed with modified independence using the least restrictive device within 7 day(s). 3.  Patient will perform sit to stand with supervision/set-up within 7 day(s). 4.  Patient will ambulate with minimal assistance/contact guard assist for 150 feet with the least restrictive device within 7 day(s). 5.  Patient will ascend/descend 4 stairs with 1 handrail(s) with minimal assistance/contact guard assist within 7 day(s). physical Therapy EVALUATION Patient: Evangelina Barrientos [de-identified]80 y.o. female) Date: 10/29/2018 Primary Diagnosis: GI bleed Precautions:   WBAT, Fall ASSESSMENT : 
Based on the objective data described below, the patient presents with decreased endurance and balance following admission for GI bleed. Patient prior to admission lives with her daughter and son-in-law in a 1 story home, patient normally ambulates with [facility] but daughter states she just recently purchased a RW for the patient due to increased falls at home. Daughter reports increased falls at night (in bathroom), most recently 1.5 weeks ago hitting her head. She went to the ER which showed no acute injury. In addition, patient has dementia at baseline. Patient received supine, she is hard of hearing and following commands requires increased repeating of instruction. She is overall Supervision- CGA for mobility. She was able to stand and transfer with RW, required increased verbal cuing for sequencing of the RW due to being a new device. She was returned to sitting. Her vitals were stable throughout.    Patient would benefit from home health at discharge due to her frequent falls and for instruction in new assistive device usage (within a familiar environment). Patient will benefit from skilled intervention to address the above impairments. Patients rehabilitation potential is considered to be Good Factors which may influence rehabilitation potential include:  
[]         None noted 
[]         Mental ability/status [x]         Medical condition 
[]         Home/family situation and support systems 
[]         Safety awareness 
[]         Pain tolerance/management 
[]         Other: PLAN : 
Recommendations and Planned Interventions: 
[x]           Bed Mobility Training             [x]    Neuromuscular Re-Education 
[x]           Transfer Training                   []    Orthotic/Prosthetic Training 
[x]           Gait Training                         []    Modalities [x]           Therapeutic Exercises           []    Edema Management/Control 
[x]           Therapeutic Activities            [x]    Patient and Family Training/Education 
[]           Other (comment): Frequency/Duration: Patient will be followed by physical therapy  5 times a week to address goals. Discharge Recommendations: Home Health Further Equipment Recommendations for Discharge: [facility]  and [facility] SUBJECTIVE:  
Patient stated .  OBJECTIVE DATA SUMMARY:  
HISTORY:   
Past Medical History:  
Diagnosis Date  Diverticulitis  Hyperlipidemia  Hypertension  Ill-defined condition   
 pacemaker  Pacemaker  Psychiatric disorder   
 anxiety  Shingles Past Surgical History:  
Procedure Laterality Date  BREAST SURGERY PROCEDURE UNLISTED    
 cyst removal  
 HX APPENDECTOMY  HX OTHER SURGICAL  8/18/15 Medtronic dual chamber PPM  
 
Prior Level of Function/Home Situation: see below Personal factors and/or comorbidities impacting plan of care: see above Home Situation Home Environment: Private residence Wheelchair Ramp: No 
One/Two Story Residence: One story Living Alone: No 
Support Systems: Family member(s)(lives with her daughter and fami;y) Patient Expects to be Discharged to[de-identified] Private residence Current DME Used/Available at Home: Cane, straight, Walker, rollingEXAMINATION/PRESENTATION/DECISION MAKING:  
Vitals Vitals:  
 10/29/18 0700 10/29/18 1100 10/29/18 1221 10/29/18 1232 BP:  120/77 (!) 121/97 BP 1 Location:  Left arm Left arm BP Patient Position:  At rest Post activity Pulse: 60 86 95 Resp:      
Temp:      
SpO2:  99% Weight:    41.8 kg (92 lb 2.4 oz) Height:    5' (1.524 m) Critical Behavior: 
Neurologic State: Drowsy, Confused Orientation Level: Oriented to person, Oriented to place Cognition: Follows commands Hearing: Auditory Auditory Impairment: Hard of hearing, right sideSkin:  All exposed intact, bruising on right side due to recent falls Edema: none noted Range Of Motion: 
AROM: Within functional limits PROM: Within functional limits Strength:   
Strength: Generally decreased, functional 
  
  
  
  
  
  
Tone & Sensation:  
Tone: Normal 
  
  
  
  
Sensation: Intact Coordination: 
Coordination: Within functional limits Vision:  
  
Functional Mobility: 
Bed Mobility: 
Rolling: Supervision Supine to Sit: Supervision Transfers: 
Sit to Stand: Supervision Stand to Sit: Supervision Bed to Chair: Supervision Balance:  
Sitting: Intact Standing: IntactAmbulation/Gait Training:Distance (ft): 20 Feet (ft) Assistive Device: Gait belt Ambulation - Level of Assistance: Contact guard assistance Gait Description (WDL): Exceptions to [facility] Therapeutic Exercises:  
 
 
Functional Measure: 
Barthel Index: 
 
Bathin Bladder: 10 Bowels: 10 
Groomin Dressin Feedin Mobility: 5 Stairs: 5 Toilet Use: 5 Transfer (Bed to Chair and Back): 5 Total: 60 Barthel and G-code impairment scale: Percentage of impairment CH 
0% CI 
1-19% CJ 
20-39% CK 
40-59% CL 
60-79% CM 
80-99% CN 
100% Barthel Score 0-100 100 99-80 79-60 59-40 20-39 1-19 
 0 Barthel Score 0-20 20 17-19 13-16 9-12 5-8 1-4 0 The Barthel ADL Index: Guidelines 1. The index should be used as a record of what a patient does, not as a record of what a patient could do. 2. The main aim is to establish degree of independence from any help, physical or verbal, however minor and for whatever reason. 3. The need for supervision renders the patient not independent. 4. A patient's performance should be established using the best available evidence. Asking the patient, friends/relatives and nurses are the usual sources, but direct observation and common sense are also important. However direct testing is not needed. 5. Usually the patient's performance over the preceding 24-48 hours is important, but occasionally longer periods will be relevant. 6. Middle categories imply that the patient supplies over 50 per cent of the effort. 7. Use of aids to be independent is allowed. Yudith Acosta., Barthel, DLorneW. (2099). Functional evaluation: the Barthel Index. [address] [facility] (14)2. Kirk Carpenter neida JOSEPH Rogers, Ronit Munoz.Alysha., [place], [address] [place] [address] (1999). Measuring the change indisability after inpatient rehabilitation; comparison of the responsiveness of the Barthel Index and Functional Dougherty Measure. Journal of Neurology, Neurosurgery, and Psychiatry, 66(4), 938-246. Ravin Bruce, N.J.A, GUTIERREZ Villalobos, & Mariam Grande MLorneA. (2004.) Assessment of post-stroke quality of life in cost-effectiveness studies: The usefulness of the Barthel Index and the EuroQoL-5D. [facility], 13, 827-45 G codes: In compliance with CMSs Claims Based Outcome Reporting, the following G-code set was chosen for this patient based on their primary functional limitation being treated: The outcome measure chosen to determine the severity of the functional limitation was the Barthel Index with a score of 60/100 which was correlated with the impairment scale. ? Mobility - Walking and Moving Around:  
  - CURRENT STATUS: CJ - 20%-39% impaired, limited or restricted  - GOAL STATUS: CI - 1%-19% impaired, limited or restricted  - D/C STATUS:  ---------------To be determined--------------- Physical Therapy Evaluation Charge Determination History Examination Presentation Decision-Making HIGH Complexity :3+ comorbidities / personal factors will impact the outcome/ POC  MEDIUM Complexity : 3 Standardized tests and measures addressing body structure, function, activity limitation and / or participation in recreation  MEDIUM Complexity : Evolving with changing characteristics  Other outcome measures Barthel Index  MEDIUM Based on the above components, the patient evaluation is determined to be of the following complexity level: MEDIUM Pain: 
Pain Scale 1: Numeric (0 - 10) Pain Intensity 1: 0 Activity Tolerance: No complications with upright activity Please refer to the flowsheet for vital signs taken during this treatment. After treatment:  
[x]         Patient left in no apparent distress sitting up in chair 
[]         Patient left in no apparent distress in bed 
[x]         Call bell left within reach [x]         Nursing notified 
[x]         Caregiver present 
[]         Bed alarm activated COMMUNICATION/EDUCATION:  
The patients plan of care was discussed with: Registered Nurse. [x]         Fall prevention education was provided and the patient/caregiver indicated understanding. [x]         Patient/family have participated as able in goal setting and plan of care. [x]         Patient/family agree to work toward stated goals and plan of care.  
[]         Patient understands intent and goals of therapy, but is neutral about his/her participation. []         Patient is unable to participate in goal setting and plan of care. Thank you for this referral. 
Rosalinda Bautista, PT, DPT Time Calculation: 19 mins

## 2018-10-29 NOTE — PROGRESS NOTES
Bedside shift change report given to Lucila Mccrary (oncoming nurse) by Adam Santamaria RN (offgoing nurse). Report included the following information SBAR, Intake/Output, MAR, Accordion and Recent Results. 0000 No change, pt sleeping between assessments, denies pain, VSS. 0400 Pt called out for bedpan, voided, noticed maroon streak when wipping. Shift summary: uneventful, pt slept between assessments, calls out when she needs to void, VSS. Bedside shift change report given to Katherin GARCIA (oncoming nurse) by Mayank King RN (offgoing nurse). Report included the following information SBAR, Intake/Output, MAR, Accordion and Recent Results.

## 2018-10-29 NOTE — PROGRESS NOTES
Problem: Falls - Risk of 
Goal: *Absence of Falls Document Panda Kapoor Fall Risk and appropriate interventions in the flowsheet. Outcome: Progressing Towards Goal 
Fall Risk Interventions: 
Mobility Interventions: Communicate number of staff needed for ambulation/transfer Mentation Interventions: Adequate sleep, hydration, pain control Medication Interventions: Patient to call before getting OOB Elimination Interventions: Bed/chair exit alarm History of Falls Interventions: Bed/chair exit alarm Problem: Patient Education: Go to Patient Education Activity Goal: Patient/Family Education Outcome: Progressing Towards Goal 
Patient calls out appropriately and was able to stay in chair this afternoon

## 2018-10-29 NOTE — PROGRESS NOTES
[address] NP 
[phone] GI PROGRESS NOTE 
 
 
NAME: Eduard Warner :  1922 MRN:  [id number] Subjective: \"My stomach is achy\" Objective: VITALS:  
Last 24hrs VS reviewed since prior progress note. Most recent are: 
Visit Vitals /67 Pulse 60 Temp 98.1 °F (36.7 °C) Resp 17 Wt 41.8 kg (92 lb 2.4 oz) SpO2 99% BMI 18.00 kg/m² Intake/Output Summary (Last 24 hours) at 10/29/2018 5649 Last data filed at 10/29/2018 0600 Gross per 24 hour Intake 2075 ml Output 1400 ml Net 675 ml PHYSICAL EXAM: 
General: Alert, in no acute distress HEENT: Anicteric sclerae. Lungs:            CTA Bilaterally. Heart:  Regular  rhythm, Abdomen: Soft, Non distended, Non tender.  (+)Bowel sounds, no HSM Extremities: No c/c/e Neurologic:  CN 2-12 gi, Alert and oriented X 2. Not oriented to time. No acute neurological distress Psych:   Not anxious nor agitated. Lab Data Reviewed:  
Recent Labs 10/29/18 
0451 10/28/18 
1406 10/28/18 
0415 WBC 9.0  --  9.1 HGB 11.6 11.1* 10.4*  10.6* HCT 35.4 33.3* 31.5*  32.1*  
  --  294 Recent Labs 10/29/18 
0451 10/28/18 
2516  141  
K 3.7 4.1  107 CO2 24 21 BUN 13 19 CREA 0.87 0.95 * 60* CA 8.6 8.7 No results for input(s): SGOT, GPT, AP, TBIL, TP, ALB, GLOB, GGT, AML, LPSE in the last 72 hours. No lab exists for component: Miguel Ángel Harmeetrich 
 
________________________________________________________________________ Patient Active Problem List  
Diagnosis Code  
 HTN (hypertension), benign I10  
 Hyperlipidemia E78.5  
 H/O herpes zoster Z86.19  
 Convulsive syncope R55  UTI (lower urinary tract infection) N39.0  Complete heart block (HCC) I44.2  Syncope R55  Anxiety F41.9  
 Pacemaker Z95.0  
 GI bleed K92.2  Dementia F03.90  
 CKD (chronic kidney disease) stage 3, GFR 30-59 ml/min ([facility]) N18.3  Hard of hearing H91.08 Assessment and Plan: 
Lower GI Bleeding:  Appears to have resolved. Old blood see in stool last night by RN, no bright red blood. Her hemoglobin is stable at 11.6. She has had one unit PRBC since her hospitalization. 
- Continue to trend hemoglobin ok to advance diet as tolerated. OK to discharge from GI standpoint. Please have her follow up our office within 2 weeks of her discharge. Please call with questions or concerns.  
 
  
Signed By: Caridad Linton NP   
 10/29/2018  9:02 AM

## 2018-10-29 NOTE — PROGRESS NOTES
General Surgery Daily Progress Note Patient: Angella Ruby MRN: [id number]  SSN: [id number] YOB: 1922  Age: 80 y.o. Sex: female Admit Date: 10/26/2018 Subjective:  
Sleeping, easily awakened, has no complaints. No reports of bleeding overnight. Current Facility-Administered Medications Medication Dose Route Frequency  dextrose 5 % - 0.45% NaCl infusion  75 mL/hr IntraVENous CONTINUOUS  
 hydrALAZINE (APRESOLINE) 20 mg/mL injection 10 mg  10 mg IntraVENous Q6H PRN  pantoprazole (PROTONIX) 40 mg in sodium chloride 0.9% 10 mL injection  40 mg IntraVENous Q24H  
 sodium chloride (NS) flush 5-10 mL  5-10 mL IntraVENous Q8H  
 sodium chloride (NS) flush 5-10 mL  5-10 mL IntraVENous PRN  
 cyanocobalamin (VITAMIN B12) tablet 500 mcg  500 mcg Oral DAILY  donepezil (ARICEPT) tablet 10 mg  10 mg Oral QHS  pravastatin (PRAVACHOL) tablet 40 mg  40 mg Oral QHS  acetaminophen (TYLENOL) tablet 500 mg  500 mg Oral BID Objective: No intake/output data recorded. 10/27 1901 - 10/29 0700 In: 2158.8 [P.O.:500; I.V.:1658.8] Out: 2050 [Urine:2050] Patient Vitals for the past 8 hrs: 
 BP Temp Pulse Resp SpO2  
10/29/18 0700   60    
10/29/18 0600 156/67  76 17 99 % 10/29/18 0530   60 12 99 % 10/29/18 0500 139/64  66 12 99 % 10/29/18 0430   77 16 98 % 10/29/18 0400 156/64 98.1 °F (36.7 °C) 72 18 100 % 10/29/18 0330   64 15 100 % Physical Exam: 
General: Drowsy, NAD Lungs: Unlabored Heart:  Regular rate and  rhythm Abdomen: Soft, non-tender, non-distended. Extremities: Warm, moves all, no edema Skin:  Warm and dry, no rash Labs:  
Recent Labs 10/29/18 
0451 WBC 9.0 HGB 11.6 HCT 35.4  Recent Labs 10/29/18 
0451 10/28/18 
2072  141  
K 3.7 4.1  107 CO2 24 21 * 60* BUN 13 19 CREA 0.87 0.95  
CA 8.6 8.7 MG  --  1.9 Assessment / Plan:  
· Lower GI bleed · Active bleed at proximal sigmoid colon on CTA 10/26 · No active bleeding on IR angio 10/27 · Hemodynamically stable, no episodes of bleeding in last 24 hours · No surgical intervention planned. Will follow.

## 2018-10-29 NOTE — PROGRESS NOTES
Riccardo Cuadra [facility] [place] [address] 
[address] [facility], [place], [address] [place] [address] 
[phone] Medical Progress Note NAME: Loan Brandon :  1922 MRM:  [id number] Date/Time: 10/29/2018 Assessment / Plan:  
 
  GI bleed: noted some maroon streak when wiping ortherwise no bleeding. Underwent emergent arteriogram on 10/27 after near-syncopal episode and unresponsiveness, found to have no active extravasation so did not embolize. Appreciate IR and gen surgery's prompt response and assistance. Continue to monitor closely. Follow serial Hg, improving. If no further bleeding today, possible discharge tomorrow. Advance diet. PT/OT eval 
 
  Acute blood loss anemia: mild/moderate. Responded nicely to 1 unit of pRBC. Follow closely Hypoglycemia: mild, likely from no PO intake. Resolved. Gentle IV hydration on D5. HTN (hypertension), benign: BP low/normal. Losartan on hold Hyperlipidemia: on statin Complete heart block [facility]): has pacemaker Dementia / Hard of hearing: on Aricept. Has had periods of delirium/agitation. Calm this morning, awake, alert. Improved. Would avoid benzos, opioids, anticholinergics. Verbally re-direct whenever possible. Avoid chemical restraints unless pt is a danger to self or others. Anxiety: as above, avoid benzos CKD (chronic kidney disease) stage 3, GFR 30-59 ml/min ([address]): Cr stable. Follow BMP Total time spent: 25 minutes, d/w daughter at bedside Time spent in the care of this patient including reviewing records, discussing with nursing and/or other providers on the treatment team, obtaining history and examining the patient, and discussing treatment plans. Care Plan discussed with: Patient, Nursing Staff and >50% of time spent in counseling and coordination of care Discussed:  Care Plan Prophylaxis:  SCD's Disposition:  Home w/Family Subjective: Chief Complaint:  Follow up GI bleed Chart/notes/labs/studies reviewed, patient examined at bedside. Resting comfortably this morning. No complaints. No further bleeding. No f/c 
 
  
 
  
Objective:  
 
 
Vitals:  
 
  
Last 24hrs VS reviewed since prior progress note. Most recent are: 
 
Visit Vitals BP (!) 121/97 (BP 1 Location: Left arm, BP Patient Position: Post activity) Pulse 95 Temp 98.1 °F (36.7 °C) Resp 17 Ht 5' (1.524 m) Wt 41.8 kg (92 lb 2.4 oz) SpO2 99% BMI 18.00 kg/m² SpO2 Readings from Last 6 Encounters:  
10/29/18 99% 10/16/18 95% 06/10/18 97% 11/15/17 99% 10/26/16 (!) 9%  
06/29/16 98% Intake/Output Summary (Last 24 hours) at 10/29/2018 1452 Last data filed at 10/29/2018 0600 Gross per 24 hour Intake 1700 ml Output 1050 ml Net 650 ml Exam:  
 
Physical Exam: 
 
Gen:  Elderly, frail. Chronically ill-appearing. NAD HEENT:  Sclerae nonicteric, hearing intact to voice, mucous membranes moist 
Neck:  Supple, without masses Resp:  No accessory muscle use, CTAB without wheezes, rales, or rhonchi 
Card: RRR, without m/r/g. No LE edema. Abd:  +bowel sounds, soft, NTTP, nondistended Neuro: Face symmetric, follows commands appropriately. No focal weakness noted Psych:  Awakes, alert, oriented to self and hospital.  Poor insight Medications Reviewed: (see below) Lab Data Reviewed: (see below) 
 
______________________________________________________________________ Medications:  
 
Current Facility-Administered Medications Medication Dose Route Frequency  dextrose 5 % - 0.45% NaCl infusion  75 mL/hr IntraVENous CONTINUOUS  
 hydrALAZINE (APRESOLINE) 20 mg/mL injection 10 mg  10 mg IntraVENous Q6H PRN  pantoprazole (PROTONIX) 40 mg in sodium chloride 0.9% 10 mL injection  40 mg IntraVENous Q24H  
 sodium chloride (NS) flush 5-10 mL  5-10 mL IntraVENous Q8H  
 sodium chloride (NS) flush 5-10 mL  5-10 mL IntraVENous PRN  
  cyanocobalamin (VITAMIN B12) tablet 500 mcg  500 mcg Oral DAILY  donepezil (ARICEPT) tablet 10 mg  10 mg Oral QHS  pravastatin (PRAVACHOL) tablet 40 mg  40 mg Oral QHS  acetaminophen (TYLENOL) tablet 500 mg  500 mg Oral BID Lab Review:  
 
Recent Labs 10/29/18 
0451 10/28/18 
1406 10/28/18 
0415  10/27/18 
0131 WBC 9.0  --  9.1  --  8.3 HGB 11.6 11.1* 10.4*  10.6*   < > 10.1* HCT 35.4 33.3* 31.5*  32.1*   < > 31.3*  
  --  294  --  265  
 < > = values in this interval not displayed. Recent Labs 10/29/18 
0451 10/28/18 
2738  141  
K 3.7 4.1  107 CO2 24 21 * 60* BUN 13 19 CREA 0.87 0.95  
CA 8.6 8.7 MG  --  1.9 No components found for: [place] No results for input(s): PH, PCO2, PO2, HCO3, FIO2 in the last 72 hours. No results for input(s): INR in the last 72 hours. No lab exists for component: INREXT, INREXT No results found for: SDES Lab Results Component Value Date/Time Culture result: NO GROWTH 2 DAYS 11/15/2017 10:51 AM  
 Culture result: NO GROWTH 6 DAYS 11/25/2014 03:13 PM  
 Culture result: ESCHERICHIA COLI 11/25/2014 12:51 PM  
 Culture result: PSEUDOMONAS AERUGINOSA 11/25/2014 12:51 PM  
 Culture result: MIXED SKIN FUENTES ISOLATED 11/25/2014 12:51 PM  
 
        
___________________________________________________ Attending Physician: Con Zimmerman MD

## 2018-10-29 NOTE — PROGRESS NOTES
Reason for Admission:  Lower GIB  
            
RRAT Score:     24 Resources/supports as identified by patient/family:   Daughter,pt is  Top Challenges facing patient (as identified by patient/family and CM): Finances/Medication cost?   Pt has Search to Phone Transportation?  daughter Support system or lack thereof? Daughter & her family Living arrangements? With her daughter ,Tara Terrazas and son-in-law,Tani    
        
Self-care/ADLs/Cognition? Is independent ,cognitively intact Current Advanced Directive/Advance Care Plan:  DNR Plan for utilizing home health:    Family prefers outpatient physical therapy with Jose Manuel Thomas @ [facility]. Likelihood of readmission: high risk Transition of Care Plan:              I contacted pt.'s daughter,Beatris Benson @ [phone]. Pt lives with her daughter and son-in-law. There are 3 RNs in the family and one hospitalist who works @ [employer]. Daughter has purchased a [employer] walker for pt. Daughter states that pt typically gets up in the am.takes a sponge bath independently,brushes her teeth and hair. She goes down to breakfast prepared by her son-in-law and drinks 3 cups of coffee,eats oatmeal or a waffle. For lunch,sheeats a banana and drinks ensure and later eats dinner. Per daughter,pt washes her own hair and loves to eat out @ [address] [place] [address]. Pt is very active & independent. I discussed the recommendations of the therapist who is recommending home health. I discussed this with pt.'s daughter who prefers for pt to continue with her outpatiernt therapist @ [facility]. When discharged,attending please supply pt with a script to continue outpatient PT (daughter's choice).  
AMD is on file listing the primary decision-maker for pt -her daughter Quirino Benson and secondary as Ha Espinoza son-in-law. I will continue to follow pt for discharge needs.  
Sisi Phillips

## 2018-10-30 VITALS
HEIGHT: 60 IN | WEIGHT: 92.15 LBS | HEART RATE: 75 BPM | DIASTOLIC BLOOD PRESSURE: 80 MMHG | OXYGEN SATURATION: 98 % | SYSTOLIC BLOOD PRESSURE: 175 MMHG | TEMPERATURE: 98.1 F | RESPIRATION RATE: 16 BRPM | BODY MASS INDEX: 18.09 KG/M2

## 2018-10-30 LAB
ERYTHROCYTE [DISTWIDTH] IN BLOOD BY AUTOMATED COUNT: 15 % (ref 11.5–14.5)
HCT VFR BLD AUTO: 32.7 % (ref 35–47)
HGB BLD-MCNC: 10.7 G/DL (ref 11.5–16)
MCH RBC QN AUTO: 32 PG (ref 26–34)
MCHC RBC AUTO-ENTMCNC: 32.7 G/DL (ref 30–36.5)
MCV RBC AUTO: 97.9 FL (ref 80–99)
NRBC # BLD: 0 K/UL (ref 0–0.01)
NRBC BLD-RTO: 0 PER 100 WBC
PLATELET # BLD AUTO: 266 K/UL (ref 150–400)
PMV BLD AUTO: 11.5 FL (ref 8.9–12.9)
RBC # BLD AUTO: 3.34 M/UL (ref 3.8–5.2)
WBC # BLD AUTO: 8.9 K/UL (ref 3.6–11)

## 2018-10-30 PROCEDURE — 97116 GAIT TRAINING THERAPY: CPT

## 2018-10-30 PROCEDURE — 97530 THERAPEUTIC ACTIVITIES: CPT

## 2018-10-30 PROCEDURE — 77030038269 HC DRN EXT URIN PURWCK BARD -A

## 2018-10-30 PROCEDURE — 36415 COLL VENOUS BLD VENIPUNCTURE: CPT | Performed by: PHYSICIAN ASSISTANT

## 2018-10-30 PROCEDURE — 74011250637 HC RX REV CODE- 250/637: Performed by: INTERNAL MEDICINE

## 2018-10-30 PROCEDURE — 74011000258 HC RX REV CODE- 258: Performed by: INTERNAL MEDICINE

## 2018-10-30 PROCEDURE — 85027 COMPLETE CBC AUTOMATED: CPT | Performed by: PHYSICIAN ASSISTANT

## 2018-10-30 RX ADMIN — Medication 10 ML: at 05:28

## 2018-10-30 RX ADMIN — DEXTROSE MONOHYDRATE AND SODIUM CHLORIDE 50 ML/HR: 5; .45 INJECTION, SOLUTION INTRAVENOUS at 05:28

## 2018-10-30 RX ADMIN — CYANOCOBALAMIN TAB 500 MCG 500 MCG: 500 TAB at 09:36

## 2018-10-30 RX ADMIN — ACETAMINOPHEN 500 MG: 500 TABLET ORAL at 09:36

## 2018-10-30 NOTE — CDMP QUERY
1.  
Patient is noted to have a BMI of 18,   (height of 5 ft. with weight of 41 kg. Please further specify if she is underweight 
 
=>Underweight 
=>Cachexia 
=>Other explanation of clinical findings =>Unable to determine (no explanation for clinical findings) This is a 79 y/o female admitted for GI bleed and anemia. On admission is noted BMI is 18. Dietician notes that family reports patient generally eats well, and has had some slow weight loss of 10 lbs. over the past three years.   Recommendations made for Ensure clear Please clarify and document your clinical opinion in the progress notes and discharge summary, including the definitive and or presumptive diagnosis, (suspected or probable), related to the above clinical findings. Please include clinical findings supporting your diagnosis. Thanks for your time. Jimmy Thomas RN, BSN 
[phone]

## 2018-10-30 NOTE — PROGRESS NOTES
Pharmacist Discharge Medication Reconciliation Discharge Provider:  Hemalatha Pérez MD 
   
  
Discharge Medications: My Medications START taking these medications Instructions Each Dose to Equal Morning Noon Evening Bedtime OTHER(NON-FORMULARY) Outpatient PT 
       
 
  
 
CHANGE how you take these medications Instructions Each Dose to Equal Morning Noon Evening Bedtime PRAVACHOL 40 mg tablet Generic drug:  pravastatin What changed:  Another medication with the same name was removed. Continue taking this medication, and follow the directions you see here. Take 40 mg by mouth nightly. 40 mg CONTINUE taking these medications Instructions Each Dose to Equal Morning Noon Evening Bedtime  
cyanocobalamin 1,000 mcg tablet Take 500 mcg by mouth daily. 500 mcg 
      
donepezil 10 mg tablet Commonly known as:  ARICEPT 10 mg nightly. 10 mg 
      
losartan 50 mg tablet Commonly known as:  COZAAR One pill by mouth at bedtime. TYLENOL EXTRA STRENGTH 500 mg tablet Generic drug:  acetaminophen Take 500 mg by mouth two (2) times a day. 500 mg 
      
 
  
 
STOP taking these medications   
 
aspirin delayed-release 81 mg tablet Where to Get Your Medications Information on where to get these meds will be given to you by the nurse or doctor. Ask your nurse or doctor about these medications · OTHER(NON-FORMULARY) The patient's chart, MAR, and AVS were reviewed by Jimmy Tarango PHARMD, Contact: [phone]

## 2018-10-30 NOTE — PROGRESS NOTES
Patient's daughter called and spoken with over the phone regarding admission paperwork, discharge planning and missing hearing aid. Patient's daughter requested Patient Advocate be called regarding missing hearing aid. Deena Thompson called, message left on secure line on patient's daughter's behalf.

## 2018-10-30 NOTE — PROGRESS NOTES
Bedside and Verbal shift change report given to Rachel (oncoming nurse) by Rios Arceo (offgoing nurse). Report included the following information SBAR, Kardex, Intake/Output, MAR, Accordion and Recent Results.

## 2018-10-30 NOTE — PROGRESS NOTES
New PCP appointment on Thursday November 1,2018 @ 11:20 a.m., with Dr. Morales Poster. Added to AVS. Anoop Thompson CM Specialist

## 2018-10-30 NOTE — PROGRESS NOTES
[address] [facility] NP 
[phone] GI PROGRESS NOTE 
 
 
NAME: Sowmya Cheung :  1922 MRN:  [id number] Subjective: No complaints today. Denies abdominal pain. Objective:  
Notified by Dr. Lin Ohara that pt had a bowel movement with bright red blood in it. Per pts daughter it was less in quantity that it had been prior to admission. VITALS:  
Last 24hrs VS reviewed since prior progress note. Most recent are: 
Visit Vitals /80 (BP 1 Location: Left arm, BP Patient Position: At rest) Pulse 75 Temp 98.1 °F (36.7 °C) Resp 16 Ht 5' (1.524 m) Wt 41.8 kg (92 lb 2.4 oz) SpO2 98% BMI 18.00 kg/m² Intake/Output Summary (Last 24 hours) at 10/30/2018 1508 Last data filed at 10/30/2018 1008 Gross per 24 hour Intake 286.25 ml Output 500 ml Net -213.75 ml PHYSICAL EXAM: 
General: Alert, in no acute distress, Hard of hearing. HEENT: Anicteric sclerae. Lungs:            CTA Bilaterally. Heart:  Regular  rhythm, Abdomen: Soft, Non distended, Non tender.  (+)Bowel sounds, no HSM Extremities: No c/c/e Neurologic:  CN 2-12 gi, Alert and oriented X 3. No acute neurological distress Psych:   Good insight. Not anxious nor agitated. Lab Data Reviewed:  
Recent Labs 10/30/18 
0226 10/29/18 
0451 WBC 8.9 9.0 HGB 10.7* 11.6 HCT 32.7* 35.4  291 Recent Labs 10/29/18 
0451 10/28/18 
4733  141  
K 3.7 4.1  107 CO2 24 21 BUN 13 19 CREA 0.87 0.95 * 60* CA 8.6 8.7 No results for input(s): SGOT, GPT, AP, TBIL, TP, ALB, GLOB, GGT, AML, LPSE in the last 72 hours. No lab exists for component: Anaid Muro 
 
________________________________________________________________________ Patient Active Problem List  
Diagnosis Code  
 HTN (hypertension), benign I10  
 Hyperlipidemia E78.5  
 H/O herpes zoster Z86.19  
 Convulsive syncope R55  UTI (lower urinary tract infection) N39.0  Complete heart block (HCC) I44.2  Syncope R55  Anxiety F41.9  
 Pacemaker Z95.0  
 GI bleed K92.2  Dementia F03.90  
 CKD (chronic kidney disease) stage 3, GFR 30-59 ml/min (HCC) N18.3  Hard of hearing H91.90 Assessment and Plan: 
Lower GI Bleeding:  Noted to have bright red blood in stool in bowel movement this am.  Amount of blood less that she had been having before per pts daughter. Hemoglobin is 10.7 this morning. 
- Continue to trend hemoglobin. - I provided her daughter our office information and asked her to call to schedule a follow up appointment within 2 weeks of her discharge. OK to discharge from GI standpoint.  
 
 
 
  
Signed By: Brandt Herrera NP   
 10/30/2018  3:08 PM

## 2018-10-30 NOTE — PROGRESS NOTES
10/30/2018  
2:26 PM 
AVS sent to [facility] in [address], notified of d/c today. Madhuri Ricketts  1:46 PM 
[facility] can accept pt for [facility], spoke w/ liaison to have [facility] for SN 10/31, she will arrange for it. [facility] information given to pt and Dtr. Pt ready for d/c from CM standpoint. Madhuri Ricketts  11:37 AM 
CM spoke w/ PT per assessment today pt safe to d/c home w/ HH, CM requested HH order from Dr. Philip Wynne. CM met w/ pt and Dtr to discuss formerly [facility] they prefer [facility] HH as they had services through them previously, pt gave verbal consent, referral sent in [address]. Noted d/c has been cancelled as pt had BM w/ blood. Will follow and assist. 
Madhuri Ricketts  10:59 AM 
D/C order noted, CM spoke w/ nursing, pt resides w/ Dtr who is here, Dtr concerned over safety to d/c to home as she only ambulated  3 ft yesterday. Per OT/PT evals 10/29 recommendation for HH at d/c and Dtr preferred outpatient. Dr. Philip Wynne and I spoke w/ pt and Dtr, will have PT eval today for recommendations. Dtr has concern over safety and is recently recovering from Sx, CM provided list of Pvt Duty for [facility]. Will follow. Madhuri Ricketts

## 2018-10-30 NOTE — PROGRESS NOTES
Problem: Mobility Impaired (Adult and Pediatric) Goal: *Acute Goals and Plan of Care (Insert Text) Physical Therapy Goals Initiated 10/29/2018 1. Patient will move from supine to sit and sit to supine  in bed with modified independence within 7 day(s). 2.  Patient will transfer from bed to chair and chair to bed with modified independence using the least restrictive device within 7 day(s). 3.  Patient will perform sit to stand with supervision/set-up within 7 day(s). 4.  Patient will ambulate with minimal assistance/contact guard assist for 150 feet with the least restrictive device within 7 day(s). 5.  Patient will ascend/descend 4 stairs with 1 handrail(s) with minimal assistance/contact guard assist within 7 day(s). physical Therapy TREATMENT Patient: Elfreda Felty [de-identified]80 y.o. female) Date: 10/30/2018 Diagnosis: GI bleed GI bleed Precautions: WBAT, Fall Chart, physical therapy assessment, plan of care and goals were reviewed. ASSESSMENT: 
Based on the objective data described below, patient participated well with treatment today. Sit on the edge of bed SBA, sit to stand SBA, ambulate with rolling walker out on the 5th floor hallway SBA, no loss of balance, assisted to and from the bathroom SBA no loss of balance OOB  to chair as tolerated performed some active range of motion exercise on both LE all planes. Daughter in the room and agreed with all goals set for the patient. Notified nurse who agreed to monitor and assist back to bed when ready to go back. Progression toward goals: 
[x]    Improving appropriately and progressing toward goals 
[]    Improving slowly and progressing toward goals 
[]    Not making progress toward goals and plan of care will be adjusted PLAN: 
Patient continues to benefit from skilled intervention to address the above impairments. Continue treatment per established plan of care. Discharge Recommendations:  Home Health Further Equipment Recommendations for Discharge:  Already has DME's SUBJECTIVE:  
Patient stated ok.  OBJECTIVE DATA SUMMARY:  
Critical Behavior: 
Neurologic State: Alert Orientation Level: Oriented X4 Cognition: Follows commands Safety/Judgement: Awareness of environment Functional Mobility Training: 
Bed Mobility: 
Rolling: Stand-by assistance Supine to Sit: Stand-by assistance Sit to Supine: Stand-by assistance Scooting: Stand-by assistance Transfers: 
Sit to Stand: Stand-by assistance Stand to Sit: Stand-by assistance Stand Pivot Transfers: Stand-by assistance Bed to Chair: Stand-by assistance Balance: 
Sitting: Intact Standing: Intact; With supportAmbulation/Gait Training: 
Distance (ft): 200 Feet (ft) Ambulation - Level of Assistance: Stand-by assistance Gait Description (WDL): Exceptions to [facility] Gait Abnormalities: Path deviations Base of Support: Widened Speed/Deidre: Pace decreased (<100 feet/min) Therapeutic Exercises:  
 Instructed patient to continue active range of motion exercise on both legs while up on chair or on bed. Pain: 
Pain Scale 1: Numeric (0 - 10) Pain Intensity 1: 0 Activity Tolerance:  
Good. Please refer to the flowsheet for vital signs taken during this treatment. After treatment:  
[x]    Patient left in no apparent distress sitting up in chair 
[]    Patient left in no apparent distress in bed 
[x]    Call bell left within reach [x]    Nursing notified 
[x]    Caregiver present 
[]    Bed alarm activated COMMUNICATION/COLLABORATION:  
The patients plan of care was discussed with: Registered Nurse,  and patient Sheryle Cordial, PT,DANISH. Time Calculation: 25 mins

## 2018-10-30 NOTE — PROGRESS NOTES
Bedside and Verbal shift change report given to Catrina Rankin (oncoming nurse) by Yanet Winn RN (offgoing nurse). Report included the following information SBAR, Kardex, Intake/Output, MAR, Accordion, Recent Results and Cardiac Rhythm Paced. Primary Nurse Sarita Davenport RN and JOSE Pandey performed a dual skin assessment on this patient No impairment noted Mathew score is 17 
 
2000: Shift assessment performed. Pt family member at bedside. Call bell in reach. · Cardiac: Paced, BPs in range, Pulses palpable · Pulmonary: RA 
· GI/: Voiding, BM today · Mentation: Alert and oriented x 4 at this time, periodic confusion, follows commands · Pain: 0/10 
· IVs: 1 PIV infusing D5 1/2NS at this time TRANSFER - OUT REPORT: 
 
Verbal report given to Criss Henson (name) on Andres Sen  being transferred to 5th floor (unit) for routine progression of care Report consisted of patients Situation, Background, Assessment and  
Recommendations(SBAR). Information from the following report(s) SBAR, Kardex, Procedure Summary, Intake/Output, MAR, Accordion, Recent Results and Cardiac Rhythm Paced was reviewed with the receiving nurse. Lines:  
Peripheral IV 10/29/18 Anterior;Proximal;Right;Superior; Upper Arm (Active) Site Assessment Clean, dry, & intact 10/29/2018  8:00 PM  
Phlebitis Assessment 0 10/29/2018  8:00 PM  
Infiltration Assessment 0 10/29/2018  8:00 PM  
Dressing Status Clean, dry, & intact 10/29/2018  8:00 PM  
Dressing Type Tape;Transparent 10/29/2018  8:00 PM  
Hub Color/Line Status Blue; Infusing 10/29/2018  8:00 PM  
Action Taken Open ports on tubing capped 10/29/2018  8:00 PM  
Alcohol Cap Used Yes 10/29/2018  8:00 PM  
  
 
Opportunity for questions and clarification was provided. Patient transported with: 
 [employer]

## 2018-10-30 NOTE — PROGRESS NOTES
TRANSFER - IN REPORT: 
 
Verbal report received from [place](name) on [address]  being received from ICU(unit) for routine progression of care Report consisted of patients Situation, Background, Assessment and  
Recommendations(SBAR). Information from the following report(s) SBAR, Kardex, Intake/Output, MAR, Accordion and Recent Results was reviewed with the receiving nurse. Opportunity for questions and clarification was provided. Assessment completed upon patients arrival to unit and care assumed. Primary Nurse Joo Connell RN performed a dual skin assessment on this patient No impairment noted Mathew score is 17

## 2018-10-30 NOTE — PROGRESS NOTES
General Surgery Up working with PT. No bleeding events noted in chart. H/H stable. We will see as needed.

## 2018-11-06 NOTE — DISCHARGE SUMMARY
Physician Discharge Summary Patient ID: 
Zena Quan [id number] 
89 y.o. 
4/17/1922 Admit date: 10/26/2018 Discharge date and time: 10/30/2018 Admission Diagnoses: GI bleed Discharge Diagnoses/Hospital Course Mr. Matheus Huitron is a 81 yo female with PMH of HTN, XOL, complete heart block and dementia admitted for GI bleed. By problem:  
 
GI bleed: underwent emergent arteriogram on 10/27 after near-syncopal episode and unresponsiveness, found to have no active extravasation so did not embolize. She received 1u PRBC's. GI continued to follow patient however no additional overt copious blood loss. She will need follow-up with GI.  
  
  Acute blood loss anemia: s/p 1u PRBC's  
 
   HTN (hypertension), benign: initially losartan on hold due to marginal BP's. Improved prior to d/c      therefore will resume 
  
  Hyperlipidemia: on statin 
  
  Complete heart block [facility]): has pacemaker  
  
  Dementia / Hard of hearing: on Aricept. Has had periods of delirium/agitation. Currently stable  
  
  CKD (chronic kidney disease) stage 3, GFR 30-59 ml/min ([facility] [address]): at baseline Underweight: encourage PO intake. Likely 2/2 muscle atrophy and aging PCP: Kavon Pineda MD  
 
Consults: GI 
 
Discharge Exam: 
Visit Vitals /80 (BP 1 Location: Left arm, BP Patient Position: At rest) Pulse 75 Temp 98.1 °F (36.7 °C) Resp 16 Ht 5' (1.524 m) Wt 41.8 kg (92 lb 2.4 oz) SpO2 98% BMI 18.00 kg/m² Disposition: home Patient Instructions:  
Discharge Medication List as of 10/30/2018  2:00 PM  
  
START taking these medications Details OTHER,NON-FORMULARY, Outpatient PT, Print, Disp-1 Each, R-0  
  
  
CONTINUE these medications which have NOT CHANGED  Details  
pravastatin (PRAVACHOL) 40 mg tablet Take 40 mg by mouth nightly., Historical Med  
  
donepezil (ARICEPT) 10 mg tablet 10 mg nightly., Historical Med  
  
losartan (COZAAR) 50 mg tablet One pill by mouth at bedtime., No Print, Disp-30 Tab, R-5  
  
acetaminophen (TYLENOL EXTRA STRENGTH) 500 mg tablet Take 500 mg by mouth two (2) times a day., Historical Med  
  
cyanocobalamin 1,000 mcg tablet Take 500 mcg by mouth daily. , Historical Med  
  
  
STOP taking these medications  
  
 aspirin delayed-release 81 mg tablet Comments:  
Reason for Stopping:   
   
  
 
 
Activity: Activity as tolerated Diet: Resume previous diet Wound Care: None needed Follow-up Information Follow up With Specialties Details Why Contact Info Sara Newman MD Family Practice On 11/1/2018 New PCP appointment on Thursday November 1,2018 @ 11:20 a.m. [address] 
[phone] [address]Lorne McneilLahey Hospital [facility] [address] 
[phone] Approximate time spent in patient care on day of discharge: 34 minutes Signed: 
Gurmeet Lawton MD 
11/6/2018 
6:41 PM

## 2019-01-09 ENCOUNTER — CLINICAL SUPPORT (OUTPATIENT)
Dept: CARDIOLOGY CLINIC | Age: 84
End: 2019-01-09

## 2019-01-09 ENCOUNTER — OFFICE VISIT (OUTPATIENT)
Dept: CARDIOLOGY CLINIC | Age: 84
End: 2019-01-09

## 2019-01-09 VITALS
HEIGHT: 60 IN | RESPIRATION RATE: 18 BRPM | WEIGHT: 92 LBS | OXYGEN SATURATION: 92 % | BODY MASS INDEX: 18.06 KG/M2 | HEART RATE: 85 BPM | SYSTOLIC BLOOD PRESSURE: 128 MMHG | DIASTOLIC BLOOD PRESSURE: 72 MMHG

## 2019-01-09 DIAGNOSIS — R55 SYNCOPE, UNSPECIFIED SYNCOPE TYPE: ICD-10-CM

## 2019-01-09 DIAGNOSIS — Z95.0 PACEMAKER: Primary | ICD-10-CM

## 2019-01-09 DIAGNOSIS — I44.2 COMPLETE HEART BLOCK (HCC): Primary | ICD-10-CM

## 2019-01-09 DIAGNOSIS — Z95.0 PACEMAKER: ICD-10-CM

## 2019-01-09 NOTE — PROGRESS NOTES
HISTORY OF PRESENTING ILLNESS      Jayme June is a 80 y.o. female with history of dual-chamber pacemaker, complete heart block, CKD, hypertension presenting for follow-up of her pacemaker. Pacemaker interrogation reveals normal device functioning. She had a recent hospitalization for GI bleeding in October. Currently she denies shortness of breath, syncope, lower extreme edema. She has chronic fatigue which is at her baseline level.        ACTIVE PROBLEM LIST     Patient Active Problem List    Diagnosis Date Noted    GI bleed 10/26/2018    Dementia 10/26/2018    CKD (chronic kidney disease) stage 3, GFR 30-59 ml/min ([address] 10/26/2018    Hard of hearing 10/26/2018    Pacemaker 10/26/2016    Complete heart block ([facility] [address] 08/17/2015    Syncope 08/17/2015    Anxiety 08/17/2015    UTI (lower urinary tract infection) 11/25/2014    Convulsive syncope 11/24/2014    HTN (hypertension), benign 11/23/2014    Hyperlipidemia 11/23/2014    H/O herpes zoster 11/23/2014           PAST MEDICAL HISTORY     Past Medical History:   Diagnosis Date    Diverticulitis     Hyperlipidemia     Hypertension     Ill-defined condition     pacemaker    Pacemaker     Psychiatric disorder     anxiety    Shingles            PAST SURGICAL HISTORY     Past Surgical History:   Procedure Laterality Date    BREAST SURGERY PROCEDURE UNLISTED      cyst removal    HX APPENDECTOMY      HX OTHER SURGICAL  8/18/15    [employer]tronic dual chamber PPM          ALLERGIES     No Known Allergies       FAMILY HISTORY     Family History   Problem Relation Age of Onset    Cancer Mother     negative for cardiac disease       SOCIAL HISTORY     Social History     Socioeconomic History    Marital status:      Spouse name: Not on file    Number of children: Not on file    Years of education: Not on file    Highest education level: Not on file   Tobacco Use    Smoking status: Never Smoker    Smokeless tobacco: Never Used   Substance and Sexual Activity    Alcohol use: No     Comment: rarely    Drug use: No         MEDICATIONS     Current Outpatient Medications   Medication Sig    medical supply, miscellaneous (V-2 HIGH COMPRESSION HOSE) by Does Not Apply route.  psyllium husk (METAMUCIL PO) Take  by mouth.  pravastatin (PRAVACHOL) 40 mg tablet Take 40 mg by mouth nightly.  donepezil (ARICEPT) 10 mg tablet 10 mg nightly.  losartan (COZAAR) 50 mg tablet One pill by mouth at bedtime.  cyanocobalamin 1,000 mcg tablet Take 500 mcg by mouth daily. No current facility-administered medications for this visit. I have reviewed the nurses notes, vitals, problem list, allergy list, medical history, family, social history and medications. REVIEW OF SYMPTOMS      General: Pt denies excessive weight gain or loss. Pt is able to conduct ADL's  HEENT: Denies blurred vision, headaches, hearing loss, epistaxis and difficulty swallowing. Respiratory: Denies cough, congestion, shortness of breath, MIRANDA, wheezing or stridor. Cardiovascular: Denies precordial pain, palpitations, edema or PND  Gastrointestinal: Denies poor appetite, indigestion, abdominal pain or blood in stool  Genitourinary: Denies hematuria, dysuria, increased urinary frequency  Musculoskeletal: Denies joint pain or swelling from muscles or joints  Neurologic: Denies tremor, paresthesias, headache, or sensory motor disturbance  Psychiatric: Denies confusion, insomnia, depression  Integumentray: Denies rash, itching or ulcers. Hematologic: Denies easy bruising, bleeding       PHYSICAL EXAMINATION      Vitals:    01/09/19 1152   BP: 128/72   Pulse: 85   Resp: 18   SpO2: 92%   Weight: 92 lb (41.7 kg)   Height: 5' (1.524 m)     General: Well developed, in no acute distress. HEENT: No jaundice, oral mucosa moist, no oral ulcers  Neck: Supple, no stiffness, no lymphadenopathy, supple  Heart:  Normal S1/S2 negative S3 or S4.  Regular, no murmur, gallop or rub, no jugular venous distention  Respiratory: Clear bilaterally x 4, no wheezing or rales  Abdomen:   Soft, non-tender, bowel sounds are active.   Extremities:  No edema, normal cap refill, no cyanosis. Musculoskeletal: No clubbing, no deformities  Neuro: A&Ox3, speech clear, gait stable, cooperative, no focal neurologic deficits  Skin: Skin color is normal. No rashes or lesions. Non diaphoretic, moist.  Vascular: 2+ pulses symmetric in all extremities       DIAGNOSTIC DATA      EKG: AV paced       LABORATORY DATA      Lab Results   Component Value Date/Time    WBC 8.9 10/30/2018 02:26 AM    Hemoglobin (POC) 14.6 06/29/2016 11:35 AM    HGB 10.7 (L) 10/30/2018 02:26 AM    Hematocrit (POC) 41 06/10/2018 04:10 PM    HCT 32.7 (L) 10/30/2018 02:26 AM    PLATELET 541 15/33/1467 02:26 AM    MCV 97.9 10/30/2018 02:26 AM      Lab Results   Component Value Date/Time    Sodium 140 10/29/2018 04:51 AM    Potassium 3.7 10/29/2018 04:51 AM    Chloride 107 10/29/2018 04:51 AM    CO2 24 10/29/2018 04:51 AM    Anion gap 9 10/29/2018 04:51 AM    Glucose 119 (H) 10/29/2018 04:51 AM    BUN 13 10/29/2018 04:51 AM    Creatinine 0.87 10/29/2018 04:51 AM    BUN/Creatinine ratio 15 10/29/2018 04:51 AM    GFR est AA >60 10/29/2018 04:51 AM    GFR est non-AA >60 10/29/2018 04:51 AM    Calcium 8.6 10/29/2018 04:51 AM    Bilirubin, total 0.6 11/15/2017 10:36 AM    AST (SGOT) 15 11/15/2017 10:36 AM    Alk. phosphatase 62 11/15/2017 10:36 AM    Protein, total 7.0 11/15/2017 10:36 AM    Albumin 3.7 11/15/2017 10:36 AM    Globulin 3.3 11/15/2017 10:36 AM    A-G Ratio 1.1 11/15/2017 10:36 AM    ALT (SGPT) 16 11/15/2017 10:36 AM           ASSESSMENT      1. Complete heart block  2. PPM  3. Syncope  4. Hypertension  5. Hyperlipidemia       PLAN     Continue current drug therapy and follow-up in device clinic       FOLLOW-UP     1 year      Thank you, Joanne Mcintosh MD for allowing me to participate in the care of this extraordinarily pleasant female. Please do not hesitate to contact me for further questions/concerns.          Rudy Jones MD  Cardiac Electrophysiology / Cardiology    [address] [facility] [address]  [address] [facility], [facility], [address] [place] [address]  [phone] / [phone] Fax   [phone] / [phone] Fax

## 2020-01-01 ENCOUNTER — APPOINTMENT (OUTPATIENT)
Dept: GENERAL RADIOLOGY | Age: 85
End: 2020-01-01
Attending: EMERGENCY MEDICINE
Payer: MEDICARE

## 2020-01-01 ENCOUNTER — HOSPITAL ENCOUNTER (INPATIENT)
Age: 85
LOS: 5 days | End: 2020-02-05
Attending: INTERNAL MEDICINE | Admitting: INTERNAL MEDICINE

## 2020-01-01 ENCOUNTER — HOSPITAL ENCOUNTER (INPATIENT)
Age: 85
End: 2020-01-01
Attending: INTERNAL MEDICINE | Admitting: INTERNAL MEDICINE

## 2020-01-01 ENCOUNTER — HOSPICE ADMISSION (OUTPATIENT)
Dept: HOSPICE | Facility: HOSPICE | Age: 85
End: 2020-01-01
Payer: OTHER MISCELLANEOUS

## 2020-01-01 ENCOUNTER — HOSPITAL ENCOUNTER (EMERGENCY)
Age: 85
Discharge: HOME | End: 2020-01-30
Attending: EMERGENCY MEDICINE
Payer: MEDICARE

## 2020-01-01 VITALS
BODY MASS INDEX: 16.73 KG/M2 | TEMPERATURE: 98.3 F | OXYGEN SATURATION: 92 % | HEART RATE: 66 BPM | DIASTOLIC BLOOD PRESSURE: 74 MMHG | RESPIRATION RATE: 12 BRPM | SYSTOLIC BLOOD PRESSURE: 159 MMHG | WEIGHT: 83 LBS | HEIGHT: 59 IN

## 2020-01-01 VITALS
SYSTOLIC BLOOD PRESSURE: 116 MMHG | TEMPERATURE: 101.6 F | HEART RATE: 94 BPM | RESPIRATION RATE: 10 BRPM | DIASTOLIC BLOOD PRESSURE: 52 MMHG | OXYGEN SATURATION: 81 %

## 2020-01-01 DIAGNOSIS — S42.92XA CLOSED FRACTURE OF LEFT SHOULDER, INITIAL ENCOUNTER: Primary | ICD-10-CM

## 2020-01-01 PROCEDURE — 74011250636 HC RX REV CODE- 250/636: Performed by: INTERNAL MEDICINE

## 2020-01-01 PROCEDURE — 74011250637 HC RX REV CODE- 250/637: Performed by: INTERNAL MEDICINE

## 2020-01-01 PROCEDURE — 0656 HSPC GENERAL INPATIENT

## 2020-01-01 PROCEDURE — 73030 X-RAY EXAM OF SHOULDER: CPT

## 2020-01-01 PROCEDURE — G0300 HHS/HOSPICE OF LPN EA 15 MIN: HCPCS

## 2020-01-01 PROCEDURE — 74011250636 HC RX REV CODE- 250/636: Performed by: EMERGENCY MEDICINE

## 2020-01-01 PROCEDURE — 0655 HSPC INPATIENT RESPITE

## 2020-01-01 PROCEDURE — G0299 HHS/HOSPICE OF RN EA 15 MIN: HCPCS

## 2020-01-01 PROCEDURE — 3336590001 HSPC ROOM AND BOARD

## 2020-01-01 PROCEDURE — 99285 EMERGENCY DEPT VISIT HI MDM: CPT

## 2020-01-01 PROCEDURE — 96375 TX/PRO/DX INJ NEW DRUG ADDON: CPT

## 2020-01-01 PROCEDURE — 3336500001 HSPC ELECTION

## 2020-01-01 PROCEDURE — 96374 THER/PROPH/DIAG INJ IV PUSH: CPT

## 2020-01-01 RX ORDER — DONEPEZIL HYDROCHLORIDE 5 MG/1
10 TABLET, FILM COATED ORAL
Status: DISCONTINUED | OUTPATIENT
Start: 2020-01-01 | End: 2020-01-01

## 2020-01-01 RX ORDER — LANOLIN ALCOHOL/MO/W.PET/CERES
3 CREAM (GRAM) TOPICAL
Status: DISCONTINUED | OUTPATIENT
Start: 2020-01-01 | End: 2020-01-01

## 2020-01-01 RX ORDER — ONDANSETRON 4 MG/1
8 TABLET, ORALLY DISINTEGRATING ORAL
Status: DISCONTINUED | OUTPATIENT
Start: 2020-01-01 | End: 2020-02-06 | Stop reason: HOSPADM

## 2020-01-01 RX ORDER — MORPHINE SULFATE 100 MG/5ML
20 SOLUTION ORAL
Status: DISCONTINUED | OUTPATIENT
Start: 2020-01-01 | End: 2020-01-01

## 2020-01-01 RX ORDER — MORPHINE SULFATE 2 MG/ML
2 INJECTION, SOLUTION INTRAMUSCULAR; INTRAVENOUS
Status: DISCONTINUED | OUTPATIENT
Start: 2020-01-01 | End: 2020-02-06 | Stop reason: HOSPADM

## 2020-01-01 RX ORDER — MORPHINE SULFATE 100 MG/5ML
10 SOLUTION ORAL
Status: DISCONTINUED | OUTPATIENT
Start: 2020-01-01 | End: 2020-01-01

## 2020-01-01 RX ORDER — MORPHINE SULFATE 2 MG/ML
2 INJECTION, SOLUTION INTRAMUSCULAR; INTRAVENOUS EVERY 4 HOURS
Status: DISCONTINUED | OUTPATIENT
Start: 2020-01-01 | End: 2020-02-06 | Stop reason: HOSPADM

## 2020-01-01 RX ORDER — HYDROCODONE BITARTRATE AND ACETAMINOPHEN 5; 325 MG/1; MG/1
1 TABLET ORAL
Status: DISCONTINUED | OUTPATIENT
Start: 2020-01-01 | End: 2020-01-01

## 2020-01-01 RX ORDER — HYOSCYAMINE SULFATE 0.12 MG/1
0.12 TABLET SUBLINGUAL
Status: DISCONTINUED | OUTPATIENT
Start: 2020-01-01 | End: 2020-02-06 | Stop reason: HOSPADM

## 2020-01-01 RX ORDER — FENTANYL CITRATE 50 UG/ML
50 INJECTION, SOLUTION INTRAMUSCULAR; INTRAVENOUS ONCE
Status: COMPLETED | OUTPATIENT
Start: 2020-01-01 | End: 2020-01-01

## 2020-01-01 RX ORDER — ONDANSETRON 2 MG/ML
4 INJECTION INTRAMUSCULAR; INTRAVENOUS
Status: DISCONTINUED | OUTPATIENT
Start: 2020-01-01 | End: 2020-01-01 | Stop reason: DRUGHIGH

## 2020-01-01 RX ORDER — DONEPEZIL HYDROCHLORIDE 10 MG/1
10 TABLET, FILM COATED ORAL
Status: DISCONTINUED | OUTPATIENT
Start: 2020-01-01 | End: 2020-01-01

## 2020-01-01 RX ORDER — ACETAMINOPHEN 325 MG/1
650 TABLET ORAL
Status: DISCONTINUED | OUTPATIENT
Start: 2020-01-01 | End: 2020-02-06 | Stop reason: HOSPADM

## 2020-01-01 RX ORDER — LANOLIN ALCOHOL/MO/W.PET/CERES
1000 CREAM (GRAM) TOPICAL DAILY
Status: DISCONTINUED | OUTPATIENT
Start: 2020-01-01 | End: 2020-01-01

## 2020-01-01 RX ORDER — HYDROCODONE BITARTRATE AND ACETAMINOPHEN 5; 325 MG/1; MG/1
1 TABLET ORAL
Qty: 20 TAB | Refills: 0 | Status: SHIPPED | OUTPATIENT
Start: 2020-01-01 | End: 2020-02-06

## 2020-01-01 RX ORDER — ONDANSETRON 2 MG/ML
8 INJECTION INTRAMUSCULAR; INTRAVENOUS ONCE
Status: COMPLETED | OUTPATIENT
Start: 2020-01-01 | End: 2020-01-01

## 2020-01-01 RX ORDER — LORAZEPAM 2 MG/ML
0.5 CONCENTRATE ORAL
Status: DISCONTINUED | OUTPATIENT
Start: 2020-01-01 | End: 2020-01-01

## 2020-01-01 RX ORDER — LORAZEPAM 2 MG/ML
0.5 INJECTION INTRAMUSCULAR
Status: DISCONTINUED | OUTPATIENT
Start: 2020-01-01 | End: 2020-02-06 | Stop reason: HOSPADM

## 2020-01-01 RX ORDER — ONDANSETRON 8 MG/1
8 TABLET, ORALLY DISINTEGRATING ORAL
Qty: 15 TAB | Refills: 0 | Status: SHIPPED | OUTPATIENT
Start: 2020-01-01

## 2020-01-01 RX ADMIN — LORAZEPAM 0.5 MG: 2 SOLUTION, CONCENTRATE ORAL at 02:32

## 2020-01-01 RX ADMIN — MORPHINE SULFATE 20 MG: 100 SOLUTION ORAL at 11:09

## 2020-01-01 RX ADMIN — MELATONIN 3 MG: at 21:00

## 2020-01-01 RX ADMIN — MORPHINE SULFATE 10 MG: 100 SOLUTION ORAL at 22:00

## 2020-01-01 RX ADMIN — MORPHINE SULFATE 20 MG: 100 SOLUTION ORAL at 18:59

## 2020-01-01 RX ADMIN — MORPHINE SULFATE 10 MG: 100 SOLUTION ORAL at 16:15

## 2020-01-01 RX ADMIN — SODIUM CHLORIDE 1000 ML: 900 INJECTION, SOLUTION INTRAVENOUS at 04:58

## 2020-01-01 RX ADMIN — LORAZEPAM 0.5 MG: 2 SOLUTION, CONCENTRATE ORAL at 21:15

## 2020-01-01 RX ADMIN — LORAZEPAM 0.5 MG: 2 SOLUTION, CONCENTRATE ORAL at 14:28

## 2020-01-01 RX ADMIN — MORPHINE SULFATE 10 MG: 100 SOLUTION ORAL at 17:23

## 2020-01-01 RX ADMIN — MORPHINE SULFATE 20 MG: 100 SOLUTION ORAL at 21:19

## 2020-01-01 RX ADMIN — LORAZEPAM 0.5 MG: 2 INJECTION INTRAMUSCULAR; INTRAVENOUS at 16:27

## 2020-01-01 RX ADMIN — CYANOCOBALAMIN TAB 500 MCG 1000 MCG: 500 TAB at 09:00

## 2020-01-01 RX ADMIN — MORPHINE SULFATE 2 MG: 2 INJECTION, SOLUTION INTRAMUSCULAR; INTRAVENOUS at 07:52

## 2020-01-01 RX ADMIN — MORPHINE SULFATE 20 MG: 100 SOLUTION ORAL at 13:24

## 2020-01-01 RX ADMIN — MORPHINE SULFATE 10 MG: 100 SOLUTION ORAL at 09:41

## 2020-01-01 RX ADMIN — MORPHINE SULFATE 2 MG: 2 INJECTION, SOLUTION INTRAMUSCULAR; INTRAVENOUS at 06:38

## 2020-01-01 RX ADMIN — MORPHINE SULFATE 10 MG: 100 SOLUTION ORAL at 17:59

## 2020-01-01 RX ADMIN — LORAZEPAM 0.5 MG: 2 SOLUTION, CONCENTRATE ORAL at 18:37

## 2020-01-01 RX ADMIN — LORAZEPAM 0.5 MG: 2 SOLUTION, CONCENTRATE ORAL at 22:00

## 2020-01-01 RX ADMIN — MORPHINE SULFATE 10 MG: 100 SOLUTION ORAL at 14:29

## 2020-01-01 RX ADMIN — MORPHINE SULFATE 10 MG: 100 SOLUTION ORAL at 06:28

## 2020-01-01 RX ADMIN — LORAZEPAM 0.5 MG: 2 SOLUTION, CONCENTRATE ORAL at 08:25

## 2020-01-01 RX ADMIN — MORPHINE SULFATE 2 MG: 2 INJECTION, SOLUTION INTRAMUSCULAR; INTRAVENOUS at 11:31

## 2020-01-01 RX ADMIN — FENTANYL CITRATE 50 MCG: 50 INJECTION, SOLUTION INTRAMUSCULAR; INTRAVENOUS at 04:55

## 2020-01-01 RX ADMIN — MORPHINE SULFATE 10 MG: 100 SOLUTION ORAL at 16:42

## 2020-01-01 RX ADMIN — MORPHINE SULFATE 10 MG: 100 SOLUTION ORAL at 08:25

## 2020-01-01 RX ADMIN — LORAZEPAM 0.5 MG: 2 SOLUTION, CONCENTRATE ORAL at 13:47

## 2020-01-01 RX ADMIN — MORPHINE SULFATE 2 MG: 2 INJECTION, SOLUTION INTRAMUSCULAR; INTRAVENOUS at 09:09

## 2020-01-01 RX ADMIN — MORPHINE SULFATE 2 MG: 2 INJECTION, SOLUTION INTRAMUSCULAR; INTRAVENOUS at 03:15

## 2020-01-01 RX ADMIN — LORAZEPAM 0.5 MG: 2 SOLUTION, CONCENTRATE ORAL at 13:24

## 2020-01-01 RX ADMIN — MORPHINE SULFATE 20 MG: 100 SOLUTION ORAL at 18:37

## 2020-01-01 RX ADMIN — DONEPEZIL HYDROCHLORIDE 10 MG: 5 TABLET, FILM COATED ORAL at 22:00

## 2020-01-01 RX ADMIN — HYDROCODONE BITARTRATE AND ACETAMINOPHEN 1 TABLET: 5; 325 TABLET ORAL at 10:28

## 2020-01-01 RX ADMIN — MORPHINE SULFATE 20 MG: 100 SOLUTION ORAL at 13:47

## 2020-01-01 RX ADMIN — MORPHINE SULFATE 20 MG: 100 SOLUTION ORAL at 12:13

## 2020-01-01 RX ADMIN — MORPHINE SULFATE 10 MG: 100 SOLUTION ORAL at 02:33

## 2020-01-01 RX ADMIN — LORAZEPAM 0.5 MG: 2 INJECTION INTRAMUSCULAR; INTRAVENOUS at 07:52

## 2020-01-01 RX ADMIN — MORPHINE SULFATE 2 MG: 2 INJECTION, SOLUTION INTRAMUSCULAR; INTRAVENOUS at 17:54

## 2020-01-01 RX ADMIN — MORPHINE SULFATE 10 MG: 100 SOLUTION ORAL at 13:49

## 2020-01-01 RX ADMIN — MORPHINE SULFATE 10 MG: 100 SOLUTION ORAL at 21:16

## 2020-01-01 RX ADMIN — LORAZEPAM 0.5 MG: 2 SOLUTION, CONCENTRATE ORAL at 09:41

## 2020-01-01 RX ADMIN — MORPHINE SULFATE 10 MG: 100 SOLUTION ORAL at 09:28

## 2020-01-01 RX ADMIN — ONDANSETRON 8 MG: 2 INJECTION INTRAMUSCULAR; INTRAVENOUS at 04:55

## 2020-01-01 RX ADMIN — MORPHINE SULFATE 2 MG: 2 INJECTION, SOLUTION INTRAMUSCULAR; INTRAVENOUS at 13:40

## 2020-01-01 RX ADMIN — LORAZEPAM 0.5 MG: 2 SOLUTION, CONCENTRATE ORAL at 09:28

## 2020-01-01 RX ADMIN — DONEPEZIL HYDROCHLORIDE 10 MG: 10 TABLET, FILM COATED ORAL at 22:00

## 2020-01-01 RX ADMIN — MORPHINE SULFATE 10 MG: 100 SOLUTION ORAL at 18:38

## 2020-01-01 RX ADMIN — MORPHINE SULFATE 2 MG: 2 INJECTION, SOLUTION INTRAMUSCULAR; INTRAVENOUS at 13:46

## 2020-01-01 RX ADMIN — MORPHINE SULFATE 2 MG: 2 INJECTION, SOLUTION INTRAMUSCULAR; INTRAVENOUS at 16:27

## 2020-01-01 RX ADMIN — MORPHINE SULFATE 2 MG: 2 INJECTION, SOLUTION INTRAMUSCULAR; INTRAVENOUS at 22:55

## 2020-01-01 RX ADMIN — MORPHINE SULFATE 10 MG: 100 SOLUTION ORAL at 06:06

## 2020-01-01 RX ADMIN — LORAZEPAM 0.5 MG: 2 SOLUTION, CONCENTRATE ORAL at 17:23

## 2020-01-01 RX ADMIN — MORPHINE SULFATE 2 MG: 2 INJECTION, SOLUTION INTRAMUSCULAR; INTRAVENOUS at 17:02

## 2020-01-30 NOTE — ED PROVIDER NOTES
The patient is a 57-year-old female with a past medical history significant for dementia, anxiety, shingles, hypertension, hyperlipidemia, diverticulitis, status post pacemaker placement, appendectomy who presents to the ED by EMS for evaluation after the patient fell from her bed which is approximately 3 feet off the floor and landed on the left side hitting her left shoulder and arm. She is complaining of left shoulder pain. The patient is right-handed. She is usually alert, extremely hard of hearing, follow commands appropriately, bedridden and under hospice care. Most of the history was obtained from the EMS crew and family at the bedside. Past Medical History:  
Diagnosis Date  Diverticulitis  Hyperlipidemia  Hypertension  Ill-defined condition   
 pacemaker  Pacemaker  Psychiatric disorder   
 anxiety  Shingles Past Surgical History:  
Procedure Laterality Date  BREAST SURGERY PROCEDURE UNLISTED    
 cyst removal  
 HX APPENDECTOMY  HX OTHER SURGICAL  8/18/15 Medtronic dual chamber PPM  
 
   
Family History:  
Problem Relation Age of Onset  Cancer Mother Social History Socioeconomic History  Marital status:  Spouse name: Not on file  Number of children: Not on file  Years of education: Not on file  Highest education level: Not on file Occupational History  Not on file Social Needs  Financial resource strain: Not on file  Food insecurity:  
  Worry: Not on file Inability: Not on file  Transportation needs:  
  Medical: Not on file Non-medical: Not on file Tobacco Use  Smoking status: Never Smoker  Smokeless tobacco: Never Used Substance and Sexual Activity  Alcohol use: No  
  Comment: rarely  Drug use: No  
 Sexual activity: Not on file Lifestyle  Physical activity:  
  Days per week: Not on file Minutes per session: Not on file  Stress: Not on file Relationships  Social connections:  
  Talks on phone: Not on file Gets together: Not on file Attends Sabianist service: Not on file Active member of club or organization: Not on file Attends meetings of clubs or organizations: Not on file Relationship status: Not on file  Intimate partner violence:  
  Fear of current or ex partner: Not on file Emotionally abused: Not on file Physically abused: Not on file Forced sexual activity: Not on file Other Topics Concern  Not on file Social History Narrative  Not on file ALLERGIES: Patient has no known allergies. Review of Systems Unable to perform ROS: Acuity of condition Vitals:  
 01/30/20 0431 Pulse: 88 Resp: 11 Temp: 98.3 °F (36.8 °C) Physical Exam 
Vitals signs and nursing note reviewed. Exam conducted with a chaperone present. CONSTITUTIONAL: Frail and cachectic elderly female; in mild distress HEAD: Normocephalic; atraumatic EYES: PERRL; EOM intact; conjunctiva and sclera are clear bilaterally. ENT: Edentulous; no rhinorrhea; normal pharynx with no tonsillar hypertrophy; mucous membranes pink/dry, no erythema, no exudate. NECK: Supple; non-tender; no cervical lymphadenopathy CARD: Normal S1, S2; no murmurs, rubs, or gallops. Regular rate and rhythm. RESP: Normal respiratory effort; breath sounds clear and equal bilaterally; no wheezes, rhonchi, or rales. ABD: Normal bowel sounds; non-distended; non-tender; no palpable organomegaly, no masses, no bruits. Back Exam: Normal inspection; no vertebral point tenderness, no CVA tenderness. Normal range of motion. EXT: Swelling, tenderness, ecchymosis of the left shoulder with decrease range of motion and mild deformity; distal pulses are normal, no edema. SKIN: Warm; dry; no rash. NEURO:Alert and incoherent, HALEY-XII grossly intact, sensory and motor are non-focal. 
 
 
 
MDM Number of Diagnoses or Management Options Diagnosis management comments: Assessment: 57-year-old female with history of dementia, hard of hearing, under hospice care who presents to the ED status post fall with left shoulder injury rule out fracture/dislocation. She remains hemodynamically stable. Plan: IV fluid/antiemetic and analgesia/x-ray of the left shoulder/serial exam/ Monitor and Reevaluate. Amount and/or Complexity of Data Reviewed Clinical lab tests: ordered and reviewed Tests in the radiology section of CPT®: reviewed and ordered Tests in the medicine section of CPT®: reviewed and ordered Discussion of test results with the performing providers: yes Decide to obtain previous medical records or to obtain history from someone other than the patient: yes Obtain history from someone other than the patient: yes Review and summarize past medical records: yes Discuss the patient with other providers: yes Independent visualization of images, tracings, or specimens: yes Risk of Complications, Morbidity, and/or Mortality Presenting problems: moderate Diagnostic procedures: moderate Management options: moderate Procedures XRAY INTERPRETATION (ED MD) Xray of Left shoulder shows comminuted impacted humeral neck fracture. No subluxation/dislocation. Tal French MD 4:39 AM 
 
Left shoulder immobilizer splint/ sling applied by Mathew Mayo MD.  Neurovascular status intact post splint application. Progress Note:  
Pt has been reexamined by Mathew Mayo MD. Pt is feeling much better. Symptoms have improved. All available results have been reviewed with pt and any available family. Pt understands sx, dx, and tx in ED. Care plan has been outlined and questions have been answered. Pt is ready to go home. Will send home on left shoulder fracture instruction. Prescription of Norco. Outpatient referral with PCP/ Ortho as needed. Written by Mathew Mayo MD,5:18 AM 
 
. Qiana Means

## 2020-01-30 NOTE — ED TRIAGE NOTES
Per EMS, pt rolled out of the left side of the bed and landed on her L shoulder. They felt crepitus at the L shoulder and pain when moving the L shoulder. Pt's only hx is a pacemaker and that she is in hospice care.

## 2020-01-30 NOTE — ED NOTES
0450  Ice placed on L shoulder. IV placed on uninjured arm. 
 
0515  L shoulder immobilizer placed on pt. Patient education given on shoulder immobilizer placement and the daughter expresses understanding and acceptance of instructions. Graciela Waterman RN 1/30/2020 5:15 AM 
 
0528  Pt in stable condition. I have reviewed discharge instructions with the patient. The patient verbalized understanding. Assisted pt into wheelchair and into her car.

## 2020-01-30 NOTE — DISCHARGE INSTRUCTIONS
Patient Education        Humerus Fracture: Care Instructions  Your Care Instructions    Your humerus is a bone in your upper arm. It extends from your shoulder to your elbow, and it is the largest bone in your arm. This bone may break (fracture) during sports, a fall, or other accidents. It may happen when your arm or shoulder is hit or used to protect you in a fall. Fractures can range from a small, hairline crack to a bone or bones broken into two or more pieces. Your treatment depends on how bad the break is. Your doctor may have put your arm in a cast, splint, or sling to allow it to heal or to keep it stable until you see another doctor. It may take weeks or months for your arm to heal. You can help your arm heal with some care at home. You heal best when you take good care of yourself. Eat a variety of healthy foods, and don't smoke. Follow-up care is a key part of your treatment and safety. Be sure to make and go to all appointments, and call your doctor if you are having problems. It's also a good idea to know your test results and keep a list of the medicines you take. How can you care for yourself at home? · Put ice or a cold pack on your arm for 10 to 20 minutes at a time. Try to do this every 1 to 2 hours for the next 3 days (when you are awake). Put a thin cloth between the ice and your cast or splint. Keep the cast or splint dry. If you do not have a splint or cast, use a cloth between the ice and your skin. · Follow the care instructions your doctor gives you. If you have a sling, do not take it off unless your doctor tells you to. · Be safe with medicines. Take pain medicines exactly as directed. ? If the doctor gave you a prescription medicine for pain, take it as prescribed. ? If you are not taking a prescription pain medicine, ask your doctor if you can take an over-the-counter medicine. · Your doctor may advise you to keep your arm next to your body.  It may help to use a pillow to support your elbow while sitting. · Follow instructions for moving your arm and doing exercises to keep your arm strong. · Wiggle your fingers and wrist often to reduce swelling and stiffness. When should you call for help? Call your doctor now or seek immediate medical care if:    · You have increased or severe pain in your arm.     · Your hand is cool or pale or changes color.     · You have tingling, weakness, or numbness in your hand or fingers.     · Your cast or splint feels too tight.     · You cannot move your fingers.     · The skin under your cast or splint is burning or stinging.    Watch closely for changes in your health, and be sure to contact your doctor if:    · You do not get better as expected. Where can you learn more? Go to http://aquiles-leola.info/. Enter V635 in the search box to learn more about \"Humerus Fracture: Care Instructions. \"  Current as of: June 26, 2019  Content Version: 12.2  © 5216-9883 [employer]. Care instructions adapted under license by [employer] (which disclaims liability or warranty for this information). If you have questions about a medical condition or this instruction, always ask your healthcare professional. [address] any warranty or liability for your use of this information.

## 2020-01-30 NOTE — ED NOTES
Spoke with Patient daughter related to IV and was advised that she was having a nurse come in that afternoon to remove. Informed if her mother required any further assistance to let us know.

## 2020-01-31 NOTE — PROGRESS NOTES
1300:New admission with a DX of Alzheimer disease with dementia and failure to  thrive from [facility]. Pt admitted with a Right shoulder FX, pluse 3 edema noted with bruising noted in sling elevated on pillows, pain on movement noted. 13:47:Pt is restless Scream in pain to right arm adm morphine 20 mg and ativan 0.5 mg .  1600:Pt sleeping no distress noted. 17:50:Pt's appetite is poor ate 10% of her meal took sips of fluids. 18:15:Dave inserted 400 ml cintia urine. 1900:Report given to Sunday Hunt LPN. NAME OF PATIENT:Rock Chou       LEVEL OF CARE: Respite  REASON FOR GIP: N/A     *PATIENT REMAINS ELIGIBLE FOR GIP LEVEL OF CARE AS EVIDENCED BY:N/A    REASON FOR RESPITE:  Caregiver breakdown     O2 SAFETY:  Concentrator positioning (6\" from furniture/drapes), Tanks stored in [facility] , No petroleum based products on face while oxygen in use and Oxygen sign on the door     FALL INTERVENTIONS PROVIDED:   Implemented/recommended use of fall risk identification flag to all team members, Implemented/recommended assistive devices and encouraged their use, Implemented/recommended resources for alarm system (personal alarm, bed alarm, call bell, etc.)  and Implemented/recommended environmental changes (remove hazards, lower bed, improve lighting, etc.)     INTERDISPLINARY COMMUNICATION/COLLABORATION:  Physician, MSW, [place] and RN, CNA     NEW MEDICATION INITIATION DOCUMENTATION:N/A      Reason medication is being initiated:  N/A     MD / Provider name consulted re: change in status / initiation of new medication:  N/A     New Symptom(s):  N/A     New Order(s):  N/A     Name of the person notified of the changes:  N/A     Name of person being taught:  N/A     Instructions given:  N/A     Side Effects taught:  N/A     Response to teaching:  N/A        COMFORTABLE DYING MEASURE:  Is Patient/family satisfied with symptom level?  yes     DISCHARGE PLAN:Pt will complete respite and go home.

## 2020-01-31 NOTE — PROGRESS NOTES
Problem: Falls - Risk of  Goal: *Absence of Falls  Description  Document Megan Hill Fall Risk and appropriate interventions in the flowsheet.   Note: Fall Risk Interventions:       Mentation Interventions: Adequate sleep, hydration, pain control, Bed/chair exit alarm    Medication Interventions: Evaluate medications/consider consulting pharmacy, Patient to call before getting OOB    Elimination Interventions: Bed/chair exit alarm, Toileting schedule/hourly rounds    History of Falls Interventions: Bed/chair exit alarm, Evaluate medications/consider consulting pharmacy, Door open when patient unattended

## 2020-01-31 NOTE — H&P
79 yo woman receiving hospice care with legacy is here at [facility] admitted for respite care. Dx is Alzheimer's disease with dementia and failure to thrive  Pt also has sustained arm/humerus fracture that is very painful and pt's arm/shoulder is supported in a sling    Medications were reviewed with JOSE De Los Santos and reconciled for ordering.

## 2020-02-01 NOTE — PROGRESS NOTES
Problem: Falls - Risk of  Goal: *Absence of Falls  Description  Document Edgar Brunner Fall Risk and appropriate interventions in the flowsheet.   Outcome: Progressing Towards Goal  Note: Fall Risk Interventions:       Mentation Interventions: Adequate sleep, hydration, pain control, Bed/chair exit alarm    Medication Interventions: Evaluate medications/consider consulting pharmacy, Patient to call before getting OOB    Elimination Interventions: Bed/chair exit alarm, Toileting schedule/hourly rounds    History of Falls Interventions: Bed/chair exit alarm, Evaluate medications/consider consulting pharmacy, Door open when patient unattended         Problem: Patient Education: Go to Patient Education Activity  Goal: Patient/Family Education  Outcome: Progressing Towards Goal     Problem: Patient Education: Go to Patient Education Activity  Goal: Patient/Family Education  Outcome: Progressing Towards Goal

## 2020-02-01 NOTE — PROGRESS NOTES
Problem: Falls - Risk of  Goal: *Absence of Falls  Description  Document Radha France Fall Risk and appropriate interventions in the flowsheet. Outcome: Progressing Towards Goal  Note: Fall Risk Interventions:       Mentation Interventions: Adequate sleep, hydration, pain control, Bed/chair exit alarm, Door open when patient unattended, Evaluate medications/consider consulting pharmacy    Medication Interventions: Bed/chair exit alarm, Evaluate medications/consider consulting pharmacy    Elimination Interventions: Bed/chair exit alarm    History of Falls Interventions: Bed/chair exit alarm, Door open when patient unattended         Problem: Pressure Injury - Risk of  Goal: *Prevention of pressure injury  Description  Document Mathew Scale and appropriate interventions in the flowsheet.   Outcome: Progressing Towards Goal  Note: Pressure Injury Interventions:  Sensory Interventions: Assess changes in LOC    Moisture Interventions: Absorbent underpads    Activity Interventions: Pressure redistribution bed/mattress(bed type)    Mobility Interventions: HOB 30 degrees or less    Nutrition Interventions: Document food/fluid/supplement intake, Offer support with meals,snacks and hydration    Friction and Shear Interventions: Apply protective barrier, creams and emollients, Minimize layers

## 2020-02-01 NOTE — PROGRESS NOTES
NAME OF PATIENT:  Ainsley Wilkerson    LEVEL OF CARE:  Respite     REASON FOR GIP:   N/A    *PATIENT REMAINS ELIGIBLE FOR GIP LEVEL OF CARE AS EVIDENCED BY: (MUST BE ADDRESSED OF PATIENT GIP)      REASON FOR RESPITE:  Caregiver breakdown    O2 SAFETY:  N/A    FALL INTERVENTIONS PROVIDED:   Implemented/recommended use of non-skid footwear, Implemented/recommended use of fall risk identification flag to all team members, Implemented/recommended assistive devices and encouraged their use, Implemented/recommended environmental changes (remove hazards, lower bed, improve lighting, etc.) and Implemented/recommended increased supervision/assistance    INTERDISPLINARY COMMUNICATION/COLLABORATION:  Physician, RUSTY, Gino Mckinnon RN, CNA and LPN    NEW MEDICATION INITIATION DOCUMENTATION:  N/A    Reason medication is being initiated:  N/A    MD / Provider name consulted re: change in status / initiation of new medication:  N/A    New Symptom(s): N/A    New Order(s): N/A    Name of the person notified of the changes:  N/A    Name of person being taught:  N/A    Instructions given:  N/A    Side Effects taught: N/A    Response to teaching:  N/A    COMFORTABLE DYING MEASURE:  Is Patient/family satisfied with symptom level?  yes    DISCHARGE PLAN:  Home pending stabilization of disease process    1900 Received report from [facility]. . Patient asleep in bed with HOB elevated. No verbal/non verbal signs of pain. Dave catheter patent and draiing medium yellow urine. Patient noted to have 1 hearing aid in a square box on the counter by sink and no other hearing aid in her ear or in the bed area. Patient is pleasantly confused. 2120 Patient accepted both scheduled and PRN medications PO. Repositioned patient in bed as she had turned herself sideways and had one hand in her pull up messing with her catheter tubing.   2300 Patient asleep in bed with no signs of discomfort. 0100 Patient asleep in bed with no acute change.   0300 Patient moves around in bed while asleep because she has turned herself almost sideways in bed. Repositioned patient without issue. 0500 Patient remains asleep in bed   0640 Patient tolerated a bed bath with no issues. She is now asleep again.

## 2020-02-01 NOTE — PROGRESS NOTES
.  0700:Report received from [facility] and [facility] Pt is awake alert to self slightly agitated easily redirected. 09:28:Feed pt ate a bowl of oat meal with sips of coffee and oj, tolerated well. Pain on movement with restlessness noted adm morphine and ativan SEE MAR.  1000:Meds effective pt sleeping, no distress noted. 12:30:Repositioned for comfort, fluids offered refused. 14:18:Change pt's gown and repositioned for comfort, mouth care done. 1600:Pt sleeping. 1800:Pt refuses to eat, incontinent care done. 1900:Report given to [facility].               NAME OF PATIENT:Rock Mortensen      LEVEL OF CARE: Respite  REASON FOR GIP: N/A     *PATIENT REMAINS ELIGIBLE FOR [facility] LEVEL OF CARE AS EVIDENCED BY:N/A    REASON FOR RESPITE:  Caregiver breakdown     O2 SAFETY:  Concentrator positioning (6\" from furniture/drapes), Tanks stored in [facility] , No petroleum based products on face while oxygen in use and Oxygen sign on the door     FALL INTERVENTIONS PROVIDED:   Implemented/recommended use of fall risk identification flag to all team members, Implemented/recommended assistive devices and encouraged their use, Implemented/recommended resources for alarm system (personal alarm, bed alarm, call bell, etc.)  and Implemented/recommended environmental changes (remove hazards, lower bed, improve lighting, etc.)     INTERDISPLINARY COMMUNICATION/COLLABORATION:  Physician, MSW, [place] and RN, CNA     NEW MEDICATION INITIATION DOCUMENTATION:N/A      Reason medication is being initiated:  N/A     MD / Provider name consulted re: change in status / initiation of new medication:  N/A     New Symptom(s):  N/A     New Order(s):  N/A     Name of the person notified of the changes:  N/A     Name of person being taught:  N/A     Instructions given:  N/A     Side Effects taught:  N/A     Response to teaching:  N/A        COMFORTABLE DYING MEASURE:  Is Patient/family satisfied with symptom level?  yes     DISCHARGE PLAN:Pt will complete respite stay and go home with family.

## 2020-02-02 NOTE — PROGRESS NOTES
Problem: Falls - Risk of  Goal: *Absence of Falls  Description  Document Dinorah Cardona Fall Risk and appropriate interventions in the flowsheet. Outcome: Progressing Towards Goal  Note: Fall Risk Interventions:       Mentation Interventions: Adequate sleep, hydration, pain control, Door open when patient unattended, Bed/chair exit alarm    Medication Interventions: Bed/chair exit alarm, Evaluate medications/consider consulting pharmacy    Elimination Interventions: Bed/chair exit alarm    History of Falls Interventions: Bed/chair exit alarm, Door open when patient unattended         Problem: Patient Education: Go to Patient Education Activity  Goal: Patient/Family Education  Outcome: Progressing Towards Goal     Problem: Pressure Injury - Risk of  Goal: *Prevention of pressure injury  Description  Document Mathew Scale and appropriate interventions in the flowsheet.   Outcome: Progressing Towards Goal  Note: Pressure Injury Interventions:  Sensory Interventions: Assess changes in LOC, Avoid rigorous massage over bony prominences, Float heels, Keep linens dry and wrinkle-free, Pad between skin to skin, Pressure redistribution bed/mattress (bed type)    Moisture Interventions: Absorbent underpads    Activity Interventions: Pressure redistribution bed/mattress(bed type)    Mobility Interventions: HOB 30 degrees or less    Nutrition Interventions: Document food/fluid/supplement intake, Offer support with meals,snacks and hydration    Friction and Shear Interventions: Apply protective barrier, creams and emollients, Minimize layers                Problem: Patient Education: Go to Patient Education Activity  Goal: Patient/Family Education  Outcome: Progressing Towards Goal

## 2020-02-02 NOTE — PROGRESS NOTES
Problem: Falls - Risk of  Goal: *Absence of Falls  Description  Document Antonella Shaffer Fall Risk and appropriate interventions in the flowsheet.   Outcome: Progressing Towards Goal  Note: Fall Risk Interventions:       Mentation Interventions: Adequate sleep, hydration, pain control, Bed/chair exit alarm, Door open when patient unattended, Evaluate medications/consider consulting pharmacy    Medication Interventions: Bed/chair exit alarm, Evaluate medications/consider consulting pharmacy    Elimination Interventions: Bed/chair exit alarm    History of Falls Interventions: Bed/chair exit alarm, Door open when patient unattended, Evaluate medications/consider consulting pharmacy         Problem: Patient Education: Go to Patient Education Activity  Goal: Patient/Family Education  Outcome: Progressing Towards Goal

## 2020-02-02 NOTE — PROGRESS NOTES
..  0700:Report received from [address] [place] [address] using SBAR I/O and Kardex. Pt is lethargic responds to voice and pain. 09:10:Adm schedule meds pt feed pt ate a bowl of oatmeal with apple sauce and a cup of juice, alert to self denies pain. 10:28: Adm NORCO 5-325 mg for left shoulder pain. 1100:Pt sleeping. 11:50:Family present at beside express concerns about discharge. 12:30:[facility] Maco Nunez RN notified of daughters concern updated nurse on Left arm swelling and pt discomfort inform her that daughter needs to be educated on EOL and meds to be schedule. 13:45:Pelon GARCIA stated that DR Moy Kulkarni wants to use current PRN orders. 14:29:Adm morphine 10mg and ativan 0.5 mg for pain and anxiety. 1500:Pt sleeping. 16:42 Adm morphine for left shoulder pain pt nods head that she is in pain. 17:15:Meds effective, resting with eyes closed. 17:50:Pt is surrounded by family daughter is visiting educated on EOL Cleve Romano also spoke with pt [facility] will meet with daughter tomorrow. 18:37:Adm morphine ativan for pain SEE mar pt grimace and shuffle.  1900:Report given to [facility] RN.         NAME OF PATIENT:Rock Vera      LEVEL OF CARE: Respite  REASON FOR GIP: N/A     *PATIENT REMAINS ELIGIBLE FOR [facility] LEVEL OF CARE AS EVIDENCED BY:N/A    REASON FOR RESPITE:Caregiver Breakdown.       O2 SAFETY:  Concentrator positioning (6\" from furniture/drapes), Tanks stored in [facility] , No petroleum based products on face while oxygen in use and Oxygen sign on the door     FALL INTERVENTIONS PROVIDED:   Implemented/recommended use of fall risk identification flag to all team members, Implemented/recommended assistive devices and encouraged their use, Implemented/recommended resources for alarm system (personal alarm, bed alarm, call bell, etc.)  and Implemented/recommended environmental changes (remove hazards, lower bed, improve lighting, etc.)     INTERDISPLINARY COMMUNICATION/COLLABORATION:  Physician, MSW, [place] and RN, CNA     NEW MEDICATION INITIATION DOCUMENTATION:N/A      Reason medication is being initiated:  N/A     MD / Provider name consulted re: change in status / initiation of new medication:  N/A     New Symptom(s):  N/A     New Order(s):  N/A     Name of the person notified of the changes:  N/A     Name of person being taught:  N/A     Instructions given:  N/A     Side Effects taught:  N/A     Response to teaching:  N/A        COMFORTABLE DYING MEASURE:  Is Patient/family satisfied with symptom level?  yes     DISCHARGE PLAN:Pt will complete respite stay and go home.

## 2020-02-02 NOTE — PROGRESS NOTES
1900- report received by Geoff Dumont- assessment done, patient lying in bed with eyes closed, easily aroused, when touched, alert and oriented to self, lungs clear throughout, bowel sounds heard in all quads, menard catheter remains patent and intact, urine clear, yellow in color, no foul odor noted, skin tear remains to right shin, dressing remains intact  2100- night medication given, patient tolerated without any issues\  2250- patient resting in bed quietly, no signs of pain or discomfort, no respiratory distress noted  0030- patient continues to rest quietly, no issues noted  0200- patient asleep, no signs of pain or discomfort noted  0400- patient continues to sleep quietly  0605- patient given bed bath and repositioned for comfort, Morphine given for left shoulder pain  0700- report given to Dada martinez LPN          NAME OF PATIENT:  Nessa Wilkerson    LEVEL OF CARE:  Respite    REASON FOR GIP:   N/A    *PATIENT REMAINS ELIGIBLE FOR GIP LEVEL OF CARE AS EVIDENCED BY: (MUST BE ADDRESSED OF PATIENT GIP)      REASON FOR RESPITE:  Caregiver breakdown    O2 SAFETY:  N/A    FALL INTERVENTIONS PROVIDED:   Implemented/recommended assistive devices and encouraged their use, Implemented/recommended resources for alarm system (personal alarm, bed alarm, call bell, etc.) , Implemented/recommended environmental changes (remove hazards, lower bed, improve lighting, etc.) and Implemented/recommended increased supervision/assistance    INTERDISPLINARY COMMUNICATION/COLLABORATION:  Physician, RUSTY, Eduar Looney RN, CNA and LPN    NEW MEDICATION INITIATION DOCUMENTATION:  N/A    Reason medication is being initiated:  N/A    MD / Provider name consulted re: change in status / initiation of new medication:  N/A    New Symptom(s):  N/A    New Order(s):  N/A    Name of the person notified of the changes:  N/A    Name of person being taught:  N/A    Instructions given:  N/A    Side Effects taught:  N/A    Response to teaching: N/A      COMFORTABLE DYING MEASURE:  Is Patient/family satisfied with symptom level?  yes    DISCHARGE PLAN:  Patient to be discharged home on 2/5

## 2020-02-03 NOTE — PROGRESS NOTES
[address] [facility]  February 3, 2020  Attending: Violeta Reza MD  Pharmacist monitoring provided for this 80y.o. year old female at [facility]. Principle Hospice Diagnosis: Alzheimer's disease with dementia and failure to thrive  Level of care: Respite  Length of stay:   Day 3/5    Current Facility-Administered Medications:     Cyanocobalamin tablet 1,000 mcg (Patient Supplied), 1,000 mcg, Oral, DAILY    donepeziL (ARICEPT) tablet 10 mg (Patient Supplied), 10 mg, Oral, QHS    hyoscyamine SL (LEVSIN/SL) tablet 0.125 mg, 0.125 mg, SubLINGual, Q1H PRN    LORazepam (INTENSOL) 2 mg/mL oral concentrate 0.5 mg, 0.5 mg, Oral, Q4H PRN    ondansetron (ZOFRAN ODT) tablet 8 mg, 8 mg, Oral, Q8H PRN    acetaminophen (TYLENOL) tablet 650 mg, 650 mg, Oral, Q4H PRN    melatonin tablet 3 mg (Patient Supplied), 3 mg, Oral, QHS    HYDROcodone-acetaminophen (NORCO) 5-325 mg per tablet 1 Tab (Patient Supplied), 1 Tab, Oral, Q6H PRN    morphine (ROXANOL) concentrated oral syringe 10 mg, 10 mg, SubLINGual, Q1H PRN    morphine (ROXANOL) concentrated oral syringe 20 mg, 20 mg, Oral, Q1H PRN    Treatment Goal Check List     Admitting dianosis treated appropriately : YES    Nausea/Vomiting medication ordered: YES    Pain medication ordered: YES    Agitation/Anxiety/ Delirium medication ordered: YES    Depression medication ordered: NO    Secretions medication ordered: YES    Constipation/Bowel routine medication ordered: YES    SOB/ Dyspnea medication ordered:NO    Insomnia: YES          Pharmacist Medication Review    Patient's medications have been reviewed by the pharmacist.  Rx bottles were identified (noted to be within date). Patient's home medications have been re-ordered correctly for inpatient use at [facility]. Labels have been affixed to prescription bottles accordingly.       Comments/Recommendations:   Hydrocodone/APAP has been reviewed and appropriately locked away for prn access.     Signed: Adriano Mishra PharmD   Phone: [phone]

## 2020-02-03 NOTE — PROGRESS NOTES
NAME OF PATIENT:  Ainsley Wilkerson    LEVEL OF CARE:  Respite     REASON FOR GIP:   N/A    *PATIENT REMAINS ELIGIBLE FOR [facility] LEVEL OF CARE AS EVIDENCED BY: N/A    REASON FOR RESPITE:  Caregiver breakdown    O2 SAFETY:  N/A    FALL INTERVENTIONS PROVIDED:   Implemented/recommended use of non-skid footwear, Implemented/recommended use of fall risk identification flag to all team members, Implemented/recommended environmental changes (remove hazards, lower bed, improve lighting, etc.) and Implemented/recommended increased supervision/assistance    INTERDISPLINARY COMMUNICATION/COLLABORATION:  Physician, MSW, [place] and RN, CNA    NEW MEDICATION INITIATION DOCUMENTATION:  Consulted AT MD to report change in pt status    Reason medication is being initiated:  N/A    MD / Provider name consulted re: change in status / initiation of new medication:  N/A    New Symptom(s):  N/A    New Order(s):  N/A    Name of the person notified of the changes:  N/A    Name of person being taught:  N/A    Instructions given:  N/A    Side Effects taught:  N/A    Response to teaching:  N/A      COMFORTABLE DYING MEASURE:  Is Patient/family satisfied with symptom level? Yes    DISCHARGE PLAN:  Home 2/5/20      0700 Report received from [facility]. 0730 Patient resting in bed quietly, respirations are even and unlabored, neutral facial expression noted. Patient awoken to touch. Lung sounds are clear, bowel sounds are active, peripheral pulses are palpable. 5493 Patient's appears to be sleeping at this time, patient's dentures hanging out of patient's mouth, dentures taken and put in cup by the sink. 0825 Patient noted to be restless and grimacing, gave PRN lorazepam and morphine, will continue to monitor. 7227 Patient repositioned in bed with help of [facility]. Patient tolerated procedure well, patient no longer appears restless or is grimacing at this time. Will continue to monitor.    4023 Attempted to feed patient breakfast, patient too lethargic for PO intake at this time. Will keep tray at bedside and try to feed patient later. 0945 Patient continues to appear to be sleeping. Respirations are even and unlabored, eyes are closed, neutral facial expression noted. 1045 Patient resting in bed quietly, respirations are even and unlabored, neutral facial expression noted, eyes are closed, patient appears to be sleeping at this time. Therapy dog visited with patient. 1150 Patient resting in bed quietly, respirations are even and unlabored. 1230 Patient repositioned in bed with help of NOÉ Doherty. Patient tolerated activity well. Patient's granddaughter at the bedside. Therapy dog visiting with patient and granddaughter at the bedside. 1300 Patient's daughter and granddaughter at the bedside. Patient is resting in bed quietly, respirations are even and unlabored. 1349 Patient noted to be grimacing, gave PRN morphine, see MAR. Patient's daughter feeding patient vanilla pudding, no other needs or complaints at this time. 1430 Patient resting in bed quietly, respirations are even and unlabored, neutral facial expression noted. Daughter and granddaughter at the bedside. 1500 Patient repositioned in bed with help of NOÉ Doherty. Daughter and granddaughter at the bedside. 1550 Patient resting in bed quietly with daughter at the bedside. No needs or complaints at this time. 1615 Patient noted to be grimacing, gave PRN morphine, will continue to monitor. 1700 Patient repositioned in bed with help of NÉO Doherty. 1723 Patient noted to be restless and grimacing, gave PRN morphine and lorazepam, will continue to monitor. Family at the bedside. [phone] Patient repositioned in bed with help of NOÉ Lovell. 1815 Patient repositioned in bed with help of NOÉ Doherty, daughter at the bedside. 1849 Patient's menard emptied by NOÉ Lovell.

## 2020-02-03 NOTE — PROGRESS NOTES
Problem: Falls - Risk of  Goal: *Absence of Falls  Description  Document Adilson Rodriguez Fall Risk and appropriate interventions in the flowsheet.   Outcome: Progressing Towards Goal  Note: Fall Risk Interventions:       Mentation Interventions: Adequate sleep, hydration, pain control, Bed/chair exit alarm, Door open when patient unattended, Family/sitter at bedside, More frequent rounding, Reorient patient, Room close to nurse's station    Medication Interventions: Bed/chair exit alarm    Elimination Interventions: Bed/chair exit alarm, Call light in reach    History of Falls Interventions: Bed/chair exit alarm, Door open when patient unattended, Room close to nurse's station

## 2020-02-03 NOTE — PROGRESS NOTES
[address] [facility]  February 3, 2020  Attending: Ammy Looney MD  Pharmacist monitoring provided for this 80y.o. year old female at [facility]. Principle Hospice Diagnosis: Alzheimer's disease with dementia and failure to thrive  Level of care: Respite  Length of stay:   Day 3/5    Current Facility-Administered Medications:     Cyanocobalamin tablet 1,000 mcg (Patient Supplied), 1,000 mcg, Oral, DAILY    donepeziL (ARICEPT) tablet 10 mg (Patient Supplied), 10 mg, Oral, QHS    hyoscyamine SL (LEVSIN/SL) tablet 0.125 mg, 0.125 mg, SubLINGual, Q1H PRN    LORazepam (INTENSOL) 2 mg/mL oral concentrate 0.5 mg, 0.5 mg, Oral, Q4H PRN    ondansetron (ZOFRAN ODT) tablet 8 mg, 8 mg, Oral, Q8H PRN    acetaminophen (TYLENOL) tablet 650 mg, 650 mg, Oral, Q4H PRN    melatonin tablet 3 mg (Patient Supplied), 3 mg, Oral, QHS    HYDROcodone-acetaminophen (NORCO) 5-325 mg per tablet 1 Tab (Patient Supplied), 1 Tab, Oral, Q6H PRN    morphine (ROXANOL) concentrated oral syringe 10 mg, 10 mg, SubLINGual, Q1H PRN    morphine (ROXANOL) concentrated oral syringe 20 mg, 20 mg, Oral, Q1H PRN    Treatment Goal Check List     Admitting dianosis treated appropriately : YES    Nausea/Vomiting medication ordered: YES    Pain medication ordered: YES    Agitation/Anxiety/ Delirium medication ordered: YES    Depression medication ordered: NO    Secretions medication ordered: YES    Constipation/Bowel routine medication ordered: YES    SOB/ Dyspnea medication ordered:NO    Insomnia: YES          Pharmacist Medication Review    Patient's medications have been reviewed by the pharmacist.  Rx bottles were identified (noted to be within date). Patient's home medications have been re-ordered correctly for inpatient use at [facility]. Labels have been affixed to prescription bottles accordingly.       Comments/Recommendations:   Hydrocodone/APAP has been reviewed and appropriately locked away for prn access.     Signed: Harriet Berg, PharmD   Phone: [phone]

## 2020-02-03 NOTE — PROGRESS NOTES
NAME OF PATIENT:  Ainsley Wilkerson    LEVEL OF CARE:  RESPITE    REASON FOR GIP:   NA    *PATIENT REMAINS ELIGIBLE FOR GIP LEVEL OF CARE AS EVIDENCED BY: (MUST BE ADDRESSED OF PATIENT GIP)      REASON FOR RESPITE:  Caregiver breakdown    O2 SAFETY:  NA    FALL INTERVENTIONS PROVIDED:   Implemented/recommended use of non-skid footwear, Implemented/recommended use of fall risk identification flag to all team members, Implemented/recommended assistive devices and encouraged their use, Implemented/recommended resources for alarm system (personal alarm, bed alarm, call bell, etc.) , Implemented/recommended environmental changes (remove hazards, lower bed, improve lighting, etc.) and Implemented/recommended increased supervision/assistance    INTERDISPLINARY COMMUNICATION/COLLABORATION:  Physician, MSW, [place] and RN, CNA    NEW MEDICATION INITIATION DOCUMENTATION:  NA    Reason medication is being initiated:  BEATRIZ    MD / Provider name consulted re: change in status / initiation of new medication:  NA    New Symptom(s):  NA    New Order(s): NA    Name of the person notified of the changes:  NA    Name of person being taught:  NA    Instructions given:  NA    Side Effects taught:  NA    Response to teaching:  NA      COMFORTABLE DYING MEASURE:  Is Patient/family satisfied with symptom level?  yes    DISCHARGE PLAN:  Return home with after Respite stay is complete       19:15 Shift report received from [facility] LPN  62:06 Patient lying in bed minimal responsive to verbal stimulus, no signs of pain or discomfort noted. Daughter at bedside. Will cont to monitor. 21:30 Patient restless, facial grimacing and frowning noted, left arm and shoulder swollen bruised and warm to touch, elevated on pillow turned and repositioned for comfort, medicated with prn Roxanol and Lorazepam.  23:00 Patient resting quietly, appears comfortable.   01:30 Patient turned and repositioned for comfort, no facial grimacing or signs of discomfort noted, resting quietly. 03:00 Patient resting quietly, no signs or symptoms of pain or agitation noted. Will cont to monitor. 05:00 Patient resting quietly, no signs of pain or discomfort noted. 06:00 Patient medicated with prn Roxanol, complete bed bath and linen change provIded by Donnell Sher CNA,turned and repositioned for comfort, mouth care provided. 07:00 Shift report given to oncoming Nurse.

## 2020-02-04 NOTE — PROGRESS NOTES
NAME OF PATIENT:  Ainsley Wilkerson    LEVEL OF CARE:  Respite   REASON FOR GIP:   N/A    *PATIENT REMAINS ELIGIBLE FOR GIP LEVEL OF CARE AS EVIDENCED BY: (MUST BE ADDRESSED OF PATIENT GIP)      REASON FOR RESPITE:  Caregiver breakdown    O2 SAFETY:  N/A    FALL INTERVENTIONS PROVIDED:   Implemented/recommended use of non-skid footwear, Implemented/recommended use of fall risk identification flag to all team members, Implemented/recommended assistive devices and encouraged their use, Implemented/recommended environmental changes (remove hazards, lower bed, improve lighting, etc.) and Implemented/recommended increased supervision/assistance    INTERDISPLINARY COMMUNICATION/COLLABORATION:  Physician, RUSTY, Tone Puckett RN, CNA and LPN    NEW MEDICATION INITIATION DOCUMENTATION:  N/A    Reason medication is being initiated:  N/A    MD / Provider name consulted re: change in status / initiation of new medication:  N/A    New Symptom(s):  N/A    New Order(s):  N/A    Name of the person notified of the changes:  N/A    Name of person being taught:  N/A    Instructions given:  N/A    Side Effects taught:  N/A    Response to teaching:  N/A    COMFORTABLE DYING MEASURE:  Is Patient/family satisfied with symptom level?  yes    DISCHARGE PLAN:  Home    1900 Received report from [facility], [facility]. Patient in bed awake with no signs of discomfort or respiratory distress. Dave patent and draining medium yellow urine. 2100 Patient awake in bed, restless. Bruising remains to LUE  2120 Patient accepted both scheduled and PRN medications PO without issue. 2330 Patient asleep in bed with no signs of discomfort or respiratory distress  0130 Patient asleep in bed  0230 Patient restless and calling out. Medicated with prn medication with no issue  0430 Patient asleep in bed, snoring, with no signs of pain or respiratory issues  0530 Patient tolerated a bed bath. She did moan during turning and repositioning but this is normal due to left UE injury. Patient immediately went back to sleep and snoring apon completion of the bath.   0720 Report given to [facility], RN. Patient asleep in bed, snoring.

## 2020-02-04 NOTE — PROGRESS NOTES
[facility] Help to Those in Need  [phone]    Patient Name: Marco Antonio Crespo  YOB: 1922    Date of Provider Hospice Visit: 02/04/20    Level of Care:   [x] General Inpatient (GIP)    [] Routine   [] Respite    Current Location of Care:  [] [facility] [] [facility] [] [facility] [] [facility] [x] [facility]): pt with legacy hospice    IF [facility], patient referred from:  [] [facility] [] [facility] [] [facility] [] [facility] [x] Home [] Other:     Principle Hospice Diagnosis: Protein calorie malnutrition  Diagnoses RELATED to the terminal prognosis: Alzheimer's disease with dementia, recent fall with injury left arm  Other Diagnoses: HTN       HOSPICE DIAGNOSES   Active Symptoms:  1. Increased pain  2 labored breathing  3 Confusion  4 restlessness/anxiety  5 left arm swelling/pain/discoloration ; likely compartment syndrome     PLAN   1. Change LOC from respite to GIP as pt has decline significantly and is having increased symptoms requiring close monitoring and frequent medication administration  2. Spoke to Dr Jayne Alvarado hospice medical director and she agrees with plan  3. Initiate SQ lines access  4. Morphine 2mg SQ scheduled every 4 hours and 2mg every hour as needed for pain  5. Ativan 0.5mg SQ every hour as needed for restlessness and anxiety  6. Support arm with pillows, avoid unnecessary movements and elevation  7. Use other comfort meds as needed    8.  and SW to support family needs  9. Disposition: home with legacy hospice once symptoms are controlled  10. Hospice Plan of care was reviewed in detail and agree with current plan of care    Prognosis estimated based on 02/04/20 clinical assessment is:   [] Hours to Days    [x] Days to Weeks    [] Other:    Communicated plan of care with: Hospice Case Manager; Hospice IDT; Care Team     GOALS OF CARE     Patient/Medical POA stated Goal of Care: comfort    [x] I have reviewed and/or updated ACP information in the Advance Care Planning Navigator.  This information is available in the [facility] [address] link in the patient's chart header. Primary Decision Maker ([address]):     Resuscitation Status: DNR  If DNR is there a Durable DNR on file? : [x] Yes [] No (If no, complete Durable DNR)    HISTORY     History obtained from: family, chart, staff    CHIEF COMPLAINT:   The patient is:   [] Verbal  [x] Nonverbal  [] Unresponsive    HPI/SUBJECTIVE:  Per staff, pt was apparently able to [address] a little until yesterday afternoon and was still in pain but manageable with medications given orally however overnight her symptoms have gotten worse with increased pain, and shortness of breath requiring more frequent medications administration. Pt has needed 4 prn lorazepam, 5 prn morphine 10mg and 5 prn morphine 20mg in past 24 hours.         REVIEW OF SYSTEMS     The following systems were: [] reviewed  [x] unable to be reviewed      Adult Non-Verbal Pain Assessment Score: 5    Face  [] 0   No particular expression or smile  [x] 1   Occasional grimace, tearing, frowning, wrinkled forehead  [] 2   Frequent grimace, tearing, frowning, wrinkled forehead    Activity (movement)  [] 0   Lying quietly, normal position  [x] 1   Seeking attention through movement or slow, cautious movement  [] 2   Restless, excessive activity and/or withdrawal reflexes    Guarding  [] 0   Lying quietly, no positioning of hands over areas of body  [x] 1   Splinting areas of the body, tense  [] 2   Rigid, stiff    Physiology (vital signs)  [] 0   Stable vital signs  [x] 1   Change in any of the following: SBP > 20mm Hg; HR > 20/minute  [] 2   Change in any of the following: SBP > 30mm Hg; HR > 25/minute    Respiratory  [] 0   Baseline RR/SpO2, compliant with ventilator  [x] 1   RR > 10 above baseline, or 5% drop SpO2, mild asynchrony with ventilator  [] 2   RR > 20 above baseline, or 10% drop SpO2, asynchrony with ventilator     FUNCTIONAL ASSESSMENT     Palliative Performance Scale (PPS): 30%       PSYCHOSOCIAL/SPIRITUAL ASSESSMENT     Active Problems:    * No active hospital problems.  *    Past Medical History:   Diagnosis Date    Diverticulitis     Hyperlipidemia     Hypertension     Ill-defined condition     pacemaker    Pacemaker     Psychiatric disorder     anxiety    Shingles       Past Surgical History:   Procedure Laterality Date    BREAST SURGERY PROCEDURE UNLISTED      cyst removal    HX APPENDECTOMY      HX OTHER SURGICAL  8/18/15    Medtronic dual chamber PPM      Social History     Tobacco Use    Smoking status: Never Smoker    Smokeless tobacco: Never Used   Substance Use Topics    Alcohol use: No     Comment: rarely     Family History   Problem Relation Age of Onset    Cancer Mother       No Known Allergies   Current Facility-Administered Medications   Medication Dose Route Frequency    morphine injection 2 mg  2 mg SubCUTAneous Q4H    morphine injection 2 mg  2 mg SubCUTAneous Q1H PRN    LORazepam (ATIVAN) injection 0.5 mg  0.5 mg SubCUTAneous Q1H PRN    hyoscyamine SL (LEVSIN/SL) tablet 0.125 mg  0.125 mg SubLINGual Q1H PRN    ondansetron (ZOFRAN ODT) tablet 8 mg  8 mg Oral Q8H PRN    acetaminophen (TYLENOL) tablet 650 mg  650 mg Oral Q4H PRN        PHYSICAL EXAM     Wt Readings from Last 3 Encounters:   01/30/20 37.6 kg (83 lb)   01/09/19 41.7 kg (92 lb)   10/29/18 41.8 kg (92 lb 2.4 oz)       Visit Vitals  /52 (BP 1 Location: Right arm, BP Patient Position: At rest;Supine)   Pulse 68   Temp 98.5 °F (36.9 °C)   Resp 12   SpO2 (!) 88%       Supplemental O2  [x] Yes  [] NO  Last bowel movement:     Currently this patient has:  [] Peripheral IV [] PICC  [] PORT [] ICD    [x] Dave Catheter [] NG Tube   [] PEG Tube    [] Rectal Tube [] Drain  [] Other:     Constitutional:lethargc, appears in pain, frown on forehead, restless  Eyes: pallor  ENMT: dry mucous membranes  Cardiovascular: normal distant heart sounds  Respiratory: labored breathing, periods of apnea  Gastrointestinal: soft, scaphoid belly, bowel sounds +  Musculoskeletal:left arm is swollen, left head of humerus in protruding, swollen, pale, dusky bluish discoloration, painful to touch or even slight movements, decreased radial pulse, warm to touch  Skin:as above  Neurologic:restlessless, confused  Psychiatric:   Other:       Pertinent Lab and or Imaging Tests:  Lab Results   Component Value Date/Time    Sodium 140 10/29/2018 04:51 AM    Potassium 3.7 10/29/2018 04:51 AM    Chloride 107 10/29/2018 04:51 AM    CO2 24 10/29/2018 04:51 AM    Anion gap 9 10/29/2018 04:51 AM    Glucose 119 (H) 10/29/2018 04:51 AM    BUN 13 10/29/2018 04:51 AM    Creatinine 0.87 10/29/2018 04:51 AM    BUN/Creatinine ratio 15 10/29/2018 04:51 AM    GFR est AA >60 10/29/2018 04:51 AM    GFR est non-AA >60 10/29/2018 04:51 AM    Calcium 8.6 10/29/2018 04:51 AM     Lab Results   Component Value Date/Time    Protein, total 7.0 11/15/2017 10:36 AM    Albumin 3.7 11/15/2017 10:36 AM           Total time: 70mts  Counseling / coordination time: 40mts  > 50% counseling / coordination?: yes

## 2020-02-04 NOTE — PROGRESS NOTES
0700  Report received. 0800  Pt lying in bed, nonverbal, eyes open, No facial grimacing. Lungs diminished. Pt is on RA  No cough noted.  + bowel sounds. abd is soft and nontender. .   Last reported BM was 2-2  Dave is draining tea colored urine. No edema in bilateral LE. Pt has edema in her L arm. Pt has bruising on her Left shoulder and arm. Skin is warm to touch. 0850 Pt resting comfortably. Pt having periods of apnea.    0930  Report given to Marylee Pikes.

## 2020-02-04 NOTE — PROGRESS NOTES
Problem: Falls - Risk of  Goal: *Absence of Falls  Description  Document Tayo Cast Fall Risk and appropriate interventions in the flowsheet.   Outcome: Progressing Towards Goal  Note: Fall Risk Interventions:       Mentation Interventions: Adequate sleep, hydration, pain control, Bed/chair exit alarm, Door open when patient unattended, Increase mobility, More frequent rounding, Reorient patient, Room close to nurse's station, Update white board    Medication Interventions: Bed/chair exit alarm    Elimination Interventions: Bed/chair exit alarm, Call light in reach    History of Falls Interventions: Bed/chair exit alarm         Problem: Patient Education: Go to Patient Education Activity  Goal: Patient/Family Education  Outcome: Progressing Towards Goal     Problem: Patient Education: Go to Patient Education Activity  Goal: Patient/Family Education  Outcome: Progressing Towards Goal

## 2020-02-04 NOTE — PROGRESS NOTES
NAME OF PATIENT:  Ainsley Wilkerson    LEVEL OF CARE:  GIP    REASON FOR GIP:   Pain, despite numerous changes in medications, Medication adjustment that must be monitored 24/7 and Stabilizing treatment that cannot take place at home    *PATIENT REMAINS ELIGIBLE FOR [facility] LEVEL OF CARE AS EVIDENCED BY:Need for scheduled and PRN subcutaneous medications for pain and agitation     REASON FOR RESPITE:  N/A    O2 SAFETY:  N/A    FALL INTERVENTIONS PROVIDED:   Implemented/recommended resources for alarm system (personal alarm, bed alarm, call bell, etc.)  and Implemented/recommended environmental changes (remove hazards, lower bed, improve lighting, etc.)    INTERDISPLINARY COMMUNICATION/COLLABORATION:  Physician, MSW, Antoine and RN, CNA    NEW MEDICATION INITIATION DOCUMENTATION:  Consulted AT MD to report change in pt status    Reason medication is being initiated:  Increased pain and restlessness    MD / Provider name consulted re: change in status / initiation of new medication:  Dr. Dalia Loredo and Dr. Jacqueline Mendiola Symptom(s):  Increased pain and restlessness    New Order(s):  Morphine 2 mg every 4 hours, morphine 2 mg every 1 hour PRN, lorazepam 0.5 mg every 1 hour as needed    Name of the person notified of the changes:  Daughter    Name of person being taught:  Daughter    Instructions given:  yes    Side Effects taught:  yes    Response to teaching:  Receptive       COMFORTABLE DYING MEASURE:  Is Patient/family satisfied with symptom level? Yes  DISCHARGE PLAN:  End of life care      0930 Report received from [facility].  0736 Patient noted to be restless and grimacing, Gave PRN morphine and lorazepam, will continue to monitor. 1025 Patient given bed bath, gown change, and linen change with NOÉ Brewer. Patient tolerated activity well, patient is no longer restless or grimacing at this time. Patient noted to have periods of apnea at times.    1115 Patient noted to be moaning out in pain, gave PRN morphine, will continue to monitor. 1213 Patient noted to be very restless at this time and moaning out in pain. Gave PRN morphine, will continue to monitor. 1336 Patient continues to have restlessness and moaning, gave PRN morphine and lorazepam, see MAR. Orders obtained to switch to GIP level of care. Subcutaneous lines places in left thigh and right upper arm.   1346 Patient given scheduled subcutaneous morphine, see MAR.   1440 Patient repositioned in bed with help of NOÉ Phoenix, patient tolerated activity well. Family at the bedside. 1505 Patient resting in bed quietly, respirations are unlabored with periods of apnea. Neutral facial expression noted, eyes are closed. 1540 Patient resting in bed quietly, respirations are unlabored with periods of apnea, neutral facial expression noted. 1627 Patient noted to have restlessness and grimacing, gave PRN morphine and lorazepam, will continue to monitor. Patient repositioned in bed with help of NOÉ Phoenix for comfort. Will continue to monitor. 1700 Patient resting in bed quietly, patient no longer grimacing or restless at this time. Will continue to monitor. 1745 Patient resting in bed quietly, respirations are unlabored with periods of apnea, neutral facial expression noted. Family is at the bedside. 1754 Patient given scheduled morphine, see MAR. Family is at the bedside. 9398 Patient turned and repositioned in bed with help of NOÉ Phoenix. Patient tolerated activity well. Family at the bedside.

## 2020-02-05 NOTE — PROGRESS NOTES
NAME OF PATIENT:  Ainsley Wilkerson    LEVEL OF CARE:  GIP    REASON FOR GIP:   Pain, despite numerous changes in medications, Medication adjustment that must be monitored 24/7 and Stabilizing treatment that cannot take place at home    *PATIENT REMAINS ELIGIBLE FOR [facility] LEVEL OF CARE AS EVIDENCED BY: (MUST BE ADDRESSED OF PATIENT GIP)      REASON FOR RESPITE:  N/A    O2 SAFETY:  N/A    FALL INTERVENTIONS PROVIDED:   Implemented/recommended use of non-skid footwear, Implemented/recommended use of fall risk identification flag to all team members, Implemented/recommended resources for alarm system (personal alarm, bed alarm, call bell, etc.) , Implemented/recommended environmental changes (remove hazards, lower bed, improve lighting, etc.) and Implemented/recommended increased supervision/assistance    INTERDISPLINARY COMMUNICATION/COLLABORATION:  Physician, RUSTY, Shelli Avalos RN, CNA and LPN    NEW MEDICATION INITIATION DOCUMENTATION:  N/A    Reason medication is being initiated:  N/A    MD / Provider name consulted re: change in status / initiation of new medication:  N/A    New Symptom(s):  N/A    New Order(s):  N/A    Name of the person notified of the changes:  N/A    Name of person being taught:  N/A    Instructions given: N/A    Side Effects taught:  N/A    Response to teaching:  N/A      COMFORTABLE DYING MEASURE:  Is Patient/family satisfied with symptom level?  yes    DISCHARGE PLAN:  End of Life Care    1900 Received report from [place], [address] [place] [address]. Patient asleep in bed, snoring with daughter at bedside. No signs of pain or respiratory distress. Respirations even and unlabored. Skin warm and dry. Dave with medium yellow urine. Unresponsive. LUE remains bruised and edematous from fall prior to admission. 2000 Patient remains asleep in bed with family at bedside. 2100 Patient remains asleep in bed with family at bedside  2200 Patient in bed, asleep, snoring. No signs of pain or respiratory distress. Family at bedside. 2300 Patient tolerated scheduled medications and remains asleep. Family remains at bedside. No verbal/non verbal signs of pain or respiratory distress. 0000 Patient asleep in bed  0100 Patient remains asleep in bed, snoring. No signs of discomfort. 0200 Patient remains asleep with no change. 0300 Patient remains asleep with no signs of discomfort or respiratory distress. Skin cool and dry. Remains unresponsive. 0400 No acute change in patient status  0500 Patient remains asleep,snoring & unresponsive in bed. No signs of pain or respiratory distress. 0600 No acute change with patient.

## 2020-02-05 NOTE — PROGRESS NOTES
Problem: Falls - Risk of  Goal: *Absence of Falls  Description  Document Kinjal Duarte Fall Risk and appropriate interventions in the flowsheet.   Outcome: Progressing Towards Goal  Note: Fall Risk Interventions:       Mentation Interventions: Adequate sleep, hydration, pain control, Door open when patient unattended    Medication Interventions: Bed/chair exit alarm    Elimination Interventions: Call light in reach, Bed/chair exit alarm    History of Falls Interventions: Bed/chair exit alarm, Door open when patient unattended         Problem: Patient Education: Go to Patient Education Activity  Goal: Patient/Family Education  Outcome: Progressing Towards Goal     Problem: Patient Education: Go to Patient Education Activity  Goal: Patient/Family Education  Outcome: Progressing Towards Goal

## 2020-02-05 NOTE — PROGRESS NOTES
[facility] Those in [facility]  [phone]    Patient Name: Emi Hamm  YOB: 1922    Date of Provider Hospice Visit: 02/05/20    Level of Care:   [x] General Inpatient (GIP)    [] Routine   [] Respite    Current Location of Care:  [] [facility] [] [facility] [] [facility] [] [address] [x] [facility]): pt with legacy hospice    IF [facility], patient referred from:  [] [facility] [] [facility] [] [facility] [] [address] [x] Home [] Other:     Principle Hospice Diagnosis: Protein calorie malnutrition  Diagnoses RELATED to the terminal prognosis: Alzheimer's disease with dementia, recent fall with injury left arm  Other Diagnoses: HTN       HOSPICE DIAGNOSES   Active Symptoms:  1. Increased pain  2 labored breathing  3 Confusion  4 restlessness/anxiety  5 left arm swelling/pain/discoloration ; likely compartment syndrome     PLAN   1. Continue GIP as pt still significantly symptomatic and  requiring close monitoring and frequent medication administration  2. Morphine 2mg SQ scheduled every 4 hours and 2mg every hour as needed for pain  3. Ativan 0.5mg SQ every hour as needed for restlessness and anxiety  4. Support arm with pillows, avoid unnecessary movements and elevation  5. Use other comfort meds as needed    6.  and SW to support family needs  7. Disposition: home with legacy hospice once symptoms are controlled  8. Hospice Plan of care was reviewed in detail and agree with current plan of care    Prognosis estimated based on 02/05/20 clinical assessment is:   [x] Hours to Days    [] Days to Weeks    [] Other:    Communicated plan of care with: Hospice Case Manager; Hospice IDT; Care Team     GOALS OF CARE     Patient/Medical POA stated Goal of Care: comfort    [x] I have reviewed and/or updated ACP information in the Advance Care Planning Navigator. This information is available in the [address] link in the patient's chart header.     Primary Decision Maker ([address]):     Resuscitation Status: DNR  If DNR is there a Durable DNR on file? : [x] Yes [] No (If no, complete Durable DNR)    HISTORY     History obtained from: family, chart, staff    CHIEF COMPLAINT:   The patient is:   [] Verbal  [x] Nonverbal  [] Unresponsive    HPI/SUBJECTIVE: pt continues to be lethargic but is much more comfortable. Breathing more at ease with periods of apnea. Pain seems better controlled. Pt has needed 1 prn lorazepam, 2 prn morphine IV in addition to scheduled doses. REVIEW OF SYSTEMS     The following systems were: [] reviewed  [x] unable to be reviewed      Adult Non-Verbal Pain Assessment Score: 2    Face  [x] 0   No particular expression or smile  [] 1   Occasional grimace, tearing, frowning, wrinkled forehead  [] 2   Frequent grimace, tearing, frowning, wrinkled forehead    Activity (movement)  [x] 0   Lying quietly, normal position  [] 1   Seeking attention through movement or slow, cautious movement  [] 2   Restless, excessive activity and/or withdrawal reflexes    Guarding  [x] 0   Lying quietly, no positioning of hands over areas of body  [] 1   Splinting areas of the body, tense  [] 2   Rigid, stiff    Physiology (vital signs)  [] 0   Stable vital signs  [x] 1   Change in any of the following: SBP > 20mm Hg; HR > 20/minute  [] 2   Change in any of the following: SBP > 30mm Hg; HR > 25/minute    Respiratory  [] 0   Baseline RR/SpO2, compliant with ventilator  [x] 1   RR > 10 above baseline, or 5% drop SpO2, mild asynchrony with ventilator  [] 2   RR > 20 above baseline, or 10% drop SpO2, asynchrony with ventilator     FUNCTIONAL ASSESSMENT     Palliative Performance Scale (PPS): 10%       PSYCHOSOCIAL/SPIRITUAL ASSESSMENT     Active Problems:    * No active hospital problems.  *    Past Medical History:   Diagnosis Date    Diverticulitis     Hyperlipidemia     Hypertension     Ill-defined condition     pacemaker    Pacemaker     Psychiatric disorder     anxiety    Shingles       Past Surgical History: Procedure Laterality Date    BREAST SURGERY PROCEDURE UNLISTED      cyst removal    HX APPENDECTOMY      HX OTHER SURGICAL  8/18/15    Medtronic dual chamber PPM      Social History     Tobacco Use    Smoking status: Never Smoker    Smokeless tobacco: Never Used   Substance Use Topics    Alcohol use: No     Comment: rarely     Family History   Problem Relation Age of Onset    Cancer Mother       No Known Allergies   Current Facility-Administered Medications   Medication Dose Route Frequency    morphine injection 2 mg  2 mg SubCUTAneous Q4H    morphine injection 2 mg  2 mg SubCUTAneous Q1H PRN    LORazepam (ATIVAN) injection 0.5 mg  0.5 mg SubCUTAneous Q1H PRN    hyoscyamine SL (LEVSIN/SL) tablet 0.125 mg  0.125 mg SubLINGual Q1H PRN    ondansetron (ZOFRAN ODT) tablet 8 mg  8 mg Oral Q8H PRN    acetaminophen (TYLENOL) tablet 650 mg  650 mg Oral Q4H PRN        PHYSICAL EXAM     Wt Readings from Last 3 Encounters:   01/30/20 37.6 kg (83 lb)   01/09/19 41.7 kg (92 lb)   10/29/18 41.8 kg (92 lb 2.4 oz)       Visit Vitals  /52 (BP 1 Location: Right arm, BP Patient Position: At rest;Supine)   Pulse 95   Temp 98.7 °F (37.1 °C)   Resp 12   SpO2 (!) 81%       Supplemental O2  [x] Yes  [] NO  Last bowel movement:     Currently this patient has:  [] Peripheral IV [] PICC  [] PORT [] ICD    [x] Dave Catheter [] NG Tube   [] PEG Tube    [] Rectal Tube [] Drain  [] Other:     Constitutional:lethargc, resting, breathing slightly labored  Eyes: pallor  ENMT: dry mucous membranes  Cardiovascular: normal distant heart sounds  Respiratory: labored breathing, periods of apnea  Gastrointestinal: soft, scaphoid belly, bowel sounds +  Musculoskeletal:left arm is swollen, left head of humerus in protruding, swollen, pale, dusky bluish discoloration, painful to touch or even slight movements, decreased radial pulse, warm to touch  Skin:as above  Neurologic:lethargic      Pertinent Lab and or Imaging Tests:  Lab Results Component Value Date/Time    Sodium 140 10/29/2018 04:51 AM    Potassium 3.7 10/29/2018 04:51 AM    Chloride 107 10/29/2018 04:51 AM    CO2 24 10/29/2018 04:51 AM    Anion gap 9 10/29/2018 04:51 AM    Glucose 119 (H) 10/29/2018 04:51 AM    BUN 13 10/29/2018 04:51 AM    Creatinine 0.87 10/29/2018 04:51 AM    BUN/Creatinine ratio 15 10/29/2018 04:51 AM    GFR est AA >60 10/29/2018 04:51 AM    GFR est non-AA >60 10/29/2018 04:51 AM    Calcium 8.6 10/29/2018 04:51 AM     Lab Results   Component Value Date/Time    Protein, total 7.0 11/15/2017 10:36 AM    Albumin 3.7 11/15/2017 10:36 AM           Total time: 35mts  Counseling / coordination time: 20mts  > 50% counseling / coordination?: yes

## 2020-02-05 NOTE — PROGRESS NOTES
NAME OF PATIENT:  Ainsley Wilkerson    LEVEL OF CARE:  GIP    REASON FOR GIP:   Medication adjustment that must be monitored 24/7 and Stabilizing treatment that cannot take place at home    *PATIENT REMAINS ELIGIBLE FOR GIP LEVEL OF CARE AS EVIDENCED BY: Need for scheduled and PRN subcutaneous medications for pain and agitation. REASON FOR RESPITE:  N/A    O2 SAFETY:  N/A    FALL INTERVENTIONS PROVIDED:   Implemented/recommended environmental changes (remove hazards, lower bed, improve lighting, etc.)    INTERDISPLINARY COMMUNICATION/COLLABORATION:  Physician, MSW, Antoine and RN, CNA    NEW MEDICATION INITIATION DOCUMENTATION:  N/A    Reason medication is being initiated:  N/A    MD / Provider name consulted re: change in status / initiation of new medication:  N/A    New Symptom(s):  N/A    New Order(s):  N/A    Name of the person notified of the changes:  N/A    Name of person being taught:  N/A    Instructions given:  N/A    Side Effects taught:  N/A    Response to teaching:  N/A      COMFORTABLE DYING MEASURE:  Is Patient/family satisfied with symptom level? Yes    DISCHARGE PLAN:  End of life care      0700 Report received from [facility].  7835 Patient resting in bed quietly, respirations are shallow and unlabored. Patient's daughter is at the bedside. 4020 Patient noted to have labored breathing, gave PRN morphine and lorazepam, will continue to monitor. Patient repositioned for comfort with help of CNA Andrew Seat. Will continue to monitor. 7446 Patient resting in bed quietly, respirations are shallow, unlabored, and with periods of apnea. Neutral facial expression noted. Daughter is at the bedside. 1904 Patient given scheduled morphine, see MAR. Oral care performed on patient by CNA Andrew Seat, patient tolerated activity well. 0945 Patient resting in bed quietly, respirations are even and unlabored. Daughter is at the bedside.   4 [facility] from [address] [place] [address] performing last rites on patient, family at the bedside. 1050 Patient resting in bed quietly, respirations are even and unlabored, eyes are closed with a neutral facial expression. Daughter is at the bedside. 1135 Patient noted to have labored breathing, gave PRN morphine. Patient repositioned in bed with help of NOÉ Phoenix for comfort. Will continue to monitor. Patient's daughter at the bedside. 1205 Patient resting in bed quietly, respirations are even and unlabored. Daughter is eating lunch at the bedside. 1245 Patient resting in bed quietly, respirations are even and unlabored with periods of apnea at times. Eyes are closed, neutral facial expression noted. Daughter is at the bedside. 1340 Patient given scheduled morphine, see MAR. Patient resting in bed quietly, respirations are even and unlabored, neutral facial expression noted. Family is at the bedside. 1410 Patient repositioned in bed with help of NOÉ Phoenix. Patient resting in bed quietly, respirations are even and unlabored, neutral facial expression noted, eyes are closed. Family is at the bedside. 925 [facility] at the bedside. 1530 Patient resting in bed quietly, respirations are even and unlabored, neutral facial expression noted and eyes are closed. Family is at the bedside. 1555 Patient turned and repositioned in bed with help of NOÉ Phoenix. Patient tolerated activity well, respirations are even and unlabored. Family is at the bedside. 1640 Patient resting in bed quietly, respirations are even and unlabored, neutral facial expression noted. [id number] Patient given scheduled morphine, see MAR. Patient resting in bed quietly, family and friends are at the bedside. 1805 Patient resting in bed quietly respirations are even and unlabored. Neutral facial expression noted. Family is at the bedside. 18 Grandson and Daughter visiting at the bedside with patient.

## 2020-02-06 NOTE — PROGRESS NOTES
Ainsley Combs patient of [address] [place] [address],  with her family members at her bedside at 46. She was admitted to [facility], on 2020, and was changed to [facility] on 2020 for management of pain and restlessness. Her symptoms were managed with Morphine and Ativan.  Lucrecia Landin RN, [address] [place] [address], was notified of the patient's death by [facility]. 2100 - Patient's Charlotte Chery was removed from the locked bin, counted, and disposed of according to [facility] policy. Her non-controlled medications were inventoried, documented, and disposed of.

## 2020-02-06 NOTE — PROGRESS NOTES
Problem: Falls - Risk of  Goal: *Absence of Falls  Description  Document Gabriel Perla Fall Risk and appropriate interventions in the flowsheet. Outcome: Resolved/Met  Note: Fall Risk Interventions:       Mentation Interventions: Adequate sleep, hydration, pain control, Door open when patient unattended, Family/sitter at bedside, Update white board    Medication Interventions: Bed/chair exit alarm    Elimination Interventions: Call light in reach    History of Falls Interventions: Bed/chair exit alarm, Door open when patient unattended         Problem: Patient Education: Go to Patient Education Activity  Goal: Patient/Family Education  Outcome: Resolved/Met     Problem: Pressure Injury - Risk of  Goal: *Prevention of pressure injury  Description  Document Mathew Scale and appropriate interventions in the flowsheet. Outcome: Resolved/Met  Note: Pressure Injury Interventions:  Sensory Interventions: Assess changes in LOC, Assess need for specialty bed, Avoid rigorous massage over bony prominences, Check visual cues for pain, Float heels, Keep linens dry and wrinkle-free, Maintain/enhance activity level, Minimize linen layers, Monitor skin under medical devices, Turn and reposition approx. every two hours (pillows and wedges if needed)    Moisture Interventions: Apply protective barrier, creams and emollients, Internal/External urinary devices, Minimize layers, Moisture barrier    Activity Interventions: Assess need for specialty bed    Mobility Interventions: Assess need for specialty bed, Float heels, Turn and reposition approx.  every two hours(pillow and wedges)    Nutrition Interventions: Offer support with meals,snacks and hydration    Friction and Shear Interventions: Apply protective barrier, creams and emollients, Lift sheet, Minimize layers                Problem: Patient Education: Go to Patient Education Activity  Goal: Patient/Family Education  Outcome: Resolved/Met

## 2020-03-05 ENCOUNTER — TELEPHONE (OUTPATIENT)
Dept: CARDIOLOGY CLINIC | Age: 85
End: 2020-03-05

## 2020-03-05 NOTE — TELEPHONE ENCOUNTER
Patients daughter is calling as she is filling out paperwork for her and is needing to know the date that the patient received her pacemaker. Please advise.     Phone: [phone]

## 2020-03-05 NOTE — TELEPHONE ENCOUNTER
Returned call to [employer], patient's daughter. Patient is  as of 20. And Ms. Wilkerson needs the date of patient's pacemaker implant. Provided her with date which ws 8/18/15. Condolences expressed for the loss of her mother.

## 2021-01-01 NOTE — PROGRESS NOTES
Room # 5    Visit Vitals  /72 (BP 1 Location: Right arm, BP Patient Position: Supine)   Pulse 85   Resp 18   Ht 5' (1.524 m)   Wt 92 lb (41.7 kg)   SpO2 92%   BMI 17.97 kg/m²     No cardiac complaints urine

## 2024-04-22 NOTE — ED PROVIDER NOTES
79 yo female with h/o dementia presents with bright red blood per rectum x 3 starting this evening. Hard of hearing and dementia limits history. Denies abdominal pain, fever, chills. Accompanied by daughter who gives most of history. Has never had this problem before. Only takes 81 mg ASA, no other blood thinners. Does not have a GI doctor. Rectal Bleeding Past Medical History:  
Diagnosis Date  Diverticulitis  Hyperlipidemia  Hypertension  Ill-defined condition   
 pacemaker  Pacemaker  Psychiatric disorder   
 anxiety  Shingles Past Surgical History:  
Procedure Laterality Date  BREAST SURGERY PROCEDURE UNLISTED    
 cyst removal  
 HX APPENDECTOMY  HX OTHER SURGICAL  8/18/15 Medtronic dual chamber PPM  
 
   
Family History:  
Problem Relation Age of Onset  Cancer Mother Social History Socioeconomic History  Marital status:  Spouse name: Not on file  Number of children: Not on file  Years of education: Not on file  Highest education level: Not on file Social Needs  Financial resource strain: Not on file  Food insecurity - worry: Not on file  Food insecurity - inability: Not on file  Transportation needs - medical: Not on file  Transportation needs - non-medical: Not on file Occupational History  Not on file Tobacco Use  Smoking status: Never Smoker  Smokeless tobacco: Never Used Substance and Sexual Activity  Alcohol use: No  
  Comment: rarely  Drug use: No  
 Sexual activity: Not on file Other Topics Concern  Not on file Social History Narrative  Not on file ALLERGIES: Patient has no known allergies. Review of Systems Unable to perform ROS: Dementia Gastrointestinal: Positive for anal bleeding. Vitals:  
 10/26/18 0117 BP: 122/66 Pulse: 78 Resp: 18 Temp: 97.7 °F (36.5 °C) SpO2: 99% Weight: 42.6 kg (94 lb) Physical Exam  
Constitutional: No distress. Elderly female, frail HENT:  
Head: Normocephalic and atraumatic. Mouth/Throat: Oropharynx is clear and moist.  
Eyes: Conjunctivae are normal. Pupils are equal, round, and reactive to light. No scleral icterus. Neck: Neck supple. No tracheal deviation present. Cardiovascular: Normal rate, regular rhythm and intact distal pulses. Pulmonary/Chest: Effort normal. No respiratory distress. She has no wheezes. She has no rales. Abdominal: Soft. She exhibits no distension. There is no tenderness. Genitourinary:  
Genitourinary Comments: Bright red blood on rectal exam  
Musculoskeletal: She exhibits no edema or deformity. Neurological: She is alert. Skin: Skin is warm and dry. Psychiatric: She has a normal mood and affect. Nursing note and vitals reviewed. MDM Number of Diagnoses or Management Options Rectal bleeding:  
Diagnosis management comments: 79 yo female with rectal bleeding. VSS. Plan to check labs, rectal exam.   
- hemoglobin stable 
- given history of three large bloody bowel movements, patient's age, and not established with Gastroenterology, will admit for serial h/h and gi consult Jacquie Martin. Marianne Andrade MD 
 
 
  
 
Procedures Quality 431: Preventive Care And Screening: Unhealthy Alcohol Use - Screening: Patient not identified as an unhealthy alcohol user when screened for unhealthy alcohol use using a systematic screening method Detail Level: Detailed Quality 226: Preventive Care And Screening: Tobacco Use: Screening And Cessation Intervention: Patient screened for tobacco use and is an ex/non-smoker